# Patient Record
Sex: FEMALE | Race: WHITE
[De-identification: names, ages, dates, MRNs, and addresses within clinical notes are randomized per-mention and may not be internally consistent; named-entity substitution may affect disease eponyms.]

---

## 2017-04-22 ENCOUNTER — HOSPITAL ENCOUNTER (INPATIENT)
Dept: HOSPITAL 74 - JER | Age: 82
LOS: 3 days | Discharge: HOME HEALTH | DRG: 558 | End: 2017-04-25
Attending: FAMILY MEDICINE | Admitting: FAMILY MEDICINE
Payer: COMMERCIAL

## 2017-04-22 VITALS — BODY MASS INDEX: 21.3 KG/M2

## 2017-04-22 DIAGNOSIS — M16.0: ICD-10-CM

## 2017-04-22 DIAGNOSIS — E78.5: ICD-10-CM

## 2017-04-22 DIAGNOSIS — Z98.890: ICD-10-CM

## 2017-04-22 DIAGNOSIS — K21.9: ICD-10-CM

## 2017-04-22 DIAGNOSIS — M25.452: ICD-10-CM

## 2017-04-22 DIAGNOSIS — K76.89: ICD-10-CM

## 2017-04-22 DIAGNOSIS — E03.9: ICD-10-CM

## 2017-04-22 DIAGNOSIS — M48.50XS: ICD-10-CM

## 2017-04-22 DIAGNOSIS — M65.852: Primary | ICD-10-CM

## 2017-04-22 DIAGNOSIS — N28.1: ICD-10-CM

## 2017-04-22 DIAGNOSIS — M47.9: ICD-10-CM

## 2017-04-22 DIAGNOSIS — M54.5: ICD-10-CM

## 2017-04-22 DIAGNOSIS — I10: ICD-10-CM

## 2017-04-22 LAB
ALBUMIN SERPL-MCNC: 3.2 G/DL (ref 3.4–5)
ALP SERPL-CCNC: 90 U/L (ref 45–117)
ALT SERPL-CCNC: 19 U/L (ref 12–78)
ANION GAP SERPL CALC-SCNC: 6 MMOL/L (ref 8–16)
AST SERPL-CCNC: 27 U/L (ref 15–37)
BASOPHILS # BLD: 1 % (ref 0–2)
BILIRUB SERPL-MCNC: 0.5 MG/DL (ref 0.2–1)
CALCIUM SERPL-MCNC: 8.4 MG/DL (ref 8.5–10.1)
CO2 SERPL-SCNC: 27 MMOL/L (ref 21–32)
CREAT SERPL-MCNC: 0.5 MG/DL (ref 0.55–1.02)
DEPRECATED RDW RBC AUTO: 13.4 % (ref 11.6–15.6)
EOSINOPHIL # BLD: 1.6 % (ref 0–4.5)
GLUCOSE SERPL-MCNC: 88 MG/DL (ref 74–106)
MCH RBC QN AUTO: 30.4 PG (ref 25.7–33.7)
MCHC RBC AUTO-ENTMCNC: 34.3 G/DL (ref 32–36)
MCV RBC: 88.6 FL (ref 80–96)
NEUTROPHILS # BLD: 62.9 % (ref 42.8–82.8)
PLATELET # BLD AUTO: 324 K/MM3 (ref 134–434)
PMV BLD: 7.1 FL (ref 7.5–11.1)
PROT SERPL-MCNC: 6.7 G/DL (ref 6.4–8.2)
WBC # BLD AUTO: 7.4 K/MM3 (ref 4–10)

## 2017-04-22 NOTE — PDOC
History of Present Illness





- General


Chief Complaint: Pain


Stated Complaint: BACK PAIN


Time Seen by Provider: 04/22/17 15:18


History Source: Patient


Exam Limitations: No Limitations





- History of Present Illness


Initial Comments: 


04/22/17 17:20


88 yo F with significant PMHx of spine injury presents with three week history 

of groin pain. She describe intermittent 10/10 shock like groin pain that 

originates in her back and shoots down her inner thigh and down to her knee. 

She states pain is aggravated by lifting her leg and certain positions. 

Alleviating by lying in fetal position and Vicodin that her daughter gave her.  

Denies neurological sensory deficit. She was hospitalized in 2015 for spinal 

fractures x 2 .  Denies fever,chills, CP, HA, abd. pain, n/v.














Occurred: reports: other (3 weeks)


Severity: reports: moderate


Pain Location: reports: lower extremity


Method of Injury: Yes: other


Modifying Factors: improves with: rest


Loss of Consciousness: no loss of consciousness


Associated Symptoms (Fall): denies symptoms





Past History





- Travel


Traveled outside of the country in the last 30 days: No


Close contact w/someone who was outside of country & ill: No





- Past Medical History


Allergies/Adverse Reactions: 


 Allergies











Allergy/AdvReac Type Severity Reaction Status Date / Time


 


Sulfa (Sulfonamide Allergy   Verified 04/09/15 14:30





Antibiotics)     


 


acetaminophen [From Tylenol] AdvReac   Verified 04/09/15 14:31











Home Medications: 


Ambulatory Orders





Aspirin [ASA -] 81 mg PO DAILY 12/14/14 


Polyethylene Glycol 3350 [Miralax 119 gm Btl -] 17 gm PO DAILY PRN 30 Days 01/23

/15 


Calcium 500Mg/Vit-D 200 Units [Os-Vito 500+D -] 1 combo PO DAILY #0 tablet 04/14/

15 


Zolpidem Tartrate [Ambien] 10 mg PO HS PRN #0 tablet 04/14/15 


Gabapentin 0 mg PO BID 04/22/17 


Propranolol HCl [Inderal -] 20 mg PO TID 04/22/17 


Lidocaine 5% Patch [Lidoderm -] 1 patch TP DAILY  patch 04/25/17 


Oxycodone HCl [Roxicodone -] 5 mg PO Q4H PRN #0 tablet MDD 6 04/25/17 








Anemia: No


Asthma: No


Cancer: No


Cardiac Disorders: Yes (PALPITATIONS)


CVA: No


COPD: No


CHF: No


Dementia: No


Diabetes: No


 Disorders: No


HTN: No


Hypercholesterolemia: Yes


Liver Disease: No


Seizures: No


Thyroid Disease: Yes





- Surgical History


Abdominal Surgery: Yes


Appendectomy: Yes


Cardiac Surgery: No


Lung Surgery: No


Neurologic Surgery: Yes (laminectomy)


Orthopedic Surgery: No





- Psycho/Social/Smoking Cessation Hx


Anxiety: No


Suicidal Ideation: No


Smoking Status: No


Smoking History: Never smoked


Have you smoked in the past 12 months: No


Number of Cigarettes Smoked Daily: 0


Information on smoking cessation initiated: No


Hx Alcohol Use: No


Drug/Substance Use Hx: No


Substance Use Type: None


Hx Substance Use Treatment: No





Trauma Specific PMHX





- Complaint Specific PMHX


Back Injury: Yes





**Review of Systems





- Review of Systems


Able to Perform ROS?: Yes


Is the patient limited English proficient: No


Neurological: Yes: Paresthesia, Other (sharp electrical pain shooting down her 

thigh. )


All Other Systems: Reviewed and Negative





*Physical Exam





- Vital Signs


 Last Vital Signs











Temp Pulse Resp BP Pulse Ox


 


 98.3 F   83   20   144/100   96 


 


 04/22/17 14:41  04/22/17 14:41  04/22/17 14:41  04/22/17 14:41  04/22/17 14:41














- Physical Exam


General Appearance: Yes: Mild Distress


HEENT: positive: EOMI, QUOC


Neck: positive: Supple


Respiratory/Chest: positive: Lungs Clear, Normal Breath Sounds.  negative: 

Respiratory Distress, Accessory Muscle Use


Cardiovascular: positive: Regular Rhythm, Regular Rate, S1, S2.  negative: Edema

, JVD, Murmur


Vascular Pulses: Dorsalis-Pedis (R): 2+, Doralis-Pedis (L): 2+


Gastrointestinal/Abdominal: positive: Normal Bowel Sounds, Flat, Soft.  negative

: Organomegaly


Musculoskeletal: positive: Normal Inspection, Decreased Range of Motion, 

Vertebral Tenderness, Other (marked tenderness at level of L4 on left side. ).  

negative: CVA Tenderness


Extremity: positive: Normal Inspection, Other (limited range of motion 

secondary to reproduction of pain )


Neurologic: positive: Fully Oriented, Alert, Normal Mood/Affect.  negative: 

Numbness, Sensory Deficit





ED Treatment Course





- LABORATORY


CBC & Chemistry Diagram: 


 04/25/17 06:15





 04/25/17 06:15





- RADIOLOGY


Radiology Studies Ordered: 














 Category Date Time Status


 


 SPINE-LUMBAR SACRAL [RAD] Stat Radiology  04/22/17 15:57 Taken














Medical Decision Making





- Medical Decision Making


04/22/17 17:26


A:88 yo F with significant PMHx of spine injury presents with three week 

history of groin pain. 


P:


* Will get CBC, CMP , and UA to r/o any infectious process and assess kidney 

and liver function to help guide analgesic choice. 


* Lumbar and Sacral XRAY as well as hip and pelvic xray to r/o acute fracture. 


* Pain meds as soon as kidney and liver funct. results available. 














*DC/Admit/Observation/Transfer


Diagnosis at time of Disposition: 


 Synovitis of hip





- Discharge Dispostion


Condition at time of disposition: Stable

## 2017-04-22 NOTE — PDOC
Attending Attestation





- Resident


Resident Name: Scott Galindo





- ED Attending Attestation


I have performed the following: I have examined & evaluated the patient, The 

case was reviewed & discussed with the resident, I agree w/resident's findings 

& plan





- HPI


HPI: 





04/22/17 17:25


87y F hx of thyroid disease, hl, palpitations, multiple back fx s/p surgery 

presents with L groin pain x 3 weeks - pt sates the pain started 3 weeks ago 

when she got out of bed and put her leg down, it was a sharp pain that started 

in her left jose and radiated to her inner thigh and down the knee. The pt 

denies fever/chills, urinary/bowel incontinence, back pain, weakness, the pain 

is worse with certain positions. Thee has been no recent trauma/injuries. On 

exam the pt has no focal back pain/tenderness, she has no focal tenderness to 

her abdomen, hips/pelvis, no discomfort on passive ROM of her L hip, but she 

seemd to have increased pain with external rotation/abduction of her L hip.





will obtain hip/plevis


will ck basic labs as pt states she has liver and renal cysts and was taken off 

of tylenol


will give pt a percocet (1 dose of tylenol should not affect her even if her 

LFTs are elvated)





will sign the ptaient out to dr. Avila to fu with the results and reassess the 

patient


may need to admit for pain mangement if cannot adaquately control her pain. 











- Physicial Exam


PE: 





04/23/17 17:59


see above





- Medical Decision Making





04/23/17 17:59


see above

## 2017-04-22 NOTE — PDOC
*Physical Exam





- Vital Signs


 Last Vital Signs











Temp Pulse Resp BP Pulse Ox


 


 98.3 F   83   20   144/100   96 


 


 04/22/17 14:41  04/22/17 14:41  04/22/17 14:41  04/22/17 14:41  04/22/17 14:41














**Heart Score/ECG Review


  ** #1


ECG reviewed & interpreted by me at: 00:51





04/23/17 00:51


Sinus rhythm, rate of 63 bpm


Axis nml


intervals nml


No st elevations or depressions


Low voltage








ED Treatment Course





- LABORATORY


CBC & Chemistry Diagram: 


 04/22/17 17:34





 04/22/17 17:34





- ADDITIONAL ORDERS


Additional order review: 


 Laboratory  Results











  04/22/17





  17:34


 


Sodium  133 L


 


Potassium  3.9


 


Chloride  100


 


Carbon Dioxide  27


 


Anion Gap  6 L


 


BUN  13


 


Creatinine  0.5 L


 


Creat Clearance w eGFR  > 60


 


Random Glucose  88


 


Calcium  8.4 L


 


Total Bilirubin  0.5  D


 


AST  27  D


 


ALT  19  D


 


Alkaline Phosphatase  90


 


Total Protein  6.7


 


Albumin  3.2 L








 











  04/22/17





  17:34


 


RBC  4.23


 


MCV  88.6


 


MCHC  34.3


 


RDW  13.4


 


MPV  7.1 L


 


Neutrophils %  62.9


 


Lymphocytes %  25.9


 


Monocytes %  8.6


 


Eosinophils %  1.6


 


Basophils %  1.0














- RADIOLOGY


Radiology Studies Ordered: 














 Category Date Time Status


 


 PELVIS CT WITHOUT CONTRAST [CT] Stat CT Scan  04/22/17 18:33 Ordered














- Medications


Given in the ED: 


ED Medications














Discontinued Medications














Generic Name Dose Route Start Last Admin





  Trade Name Freq  PRN Reason Stop Dose Admin


 


Oxycodone/Acetaminophen  1 combo 04/22/17 17:24 04/22/17 17:35





  Percocet 5/325 -  PO 04/22/17 17:25  1 combo





  ONCE ONE   Administration














Medical Decision Making





- Medical Decision Making


04/22/17 18:48


I received this patient on sign out


She has ha 3 weeks of left groin pain


No trauma


No fevers


Pain is worse with flexion and external rotation of the left hip





X ray reviewed


pt osteopenic


Unclear if there is an occult fracture


will do Ct


pt continues to have pain


Will re assess for need for observation





04/22/17 20:30


CT demonstrates:


Small left joint effusion with thickening of the synovium with subtle stranding 

suggesting possible synovitis due either to rheumatoid or inflammatory 

arthritic process vs. septic joint





pt continuies to have pain with either extending her left leg, or moving her 

left leg





will admit 





04/22/17 20:33


case reviewed with Dr Martínez


will hold on abx


will consult ortho/id





clinical impression: synovitis





*DC/Admit/Observation/Transfer


Diagnosis at time of Disposition: 


 Synovitis of hip





- Discharge Dispostion


Condition at time of disposition: Stable


Admit: Yes

## 2017-04-23 LAB
ALBUMIN SERPL-MCNC: 3.3 G/DL (ref 3.4–5)
ALP SERPL-CCNC: 93 U/L (ref 45–117)
ALT SERPL-CCNC: 17 U/L (ref 12–78)
ANION GAP SERPL CALC-SCNC: 9 MMOL/L (ref 8–16)
AST SERPL-CCNC: 29 U/L (ref 15–37)
BASOPHILS # BLD: 0.7 % (ref 0–2)
BILIRUB SERPL-MCNC: 0.6 MG/DL (ref 0.2–1)
CALCIUM SERPL-MCNC: 8.7 MG/DL (ref 8.5–10.1)
CO2 SERPL-SCNC: 28 MMOL/L (ref 21–32)
COCKROFT - GAULT: 58.83
CREAT SERPL-MCNC: 0.6 MG/DL (ref 0.55–1.02)
DEPRECATED RDW RBC AUTO: 13.2 % (ref 11.6–15.6)
EOSINOPHIL # BLD: 1.5 % (ref 0–4.5)
GLUCOSE SERPL-MCNC: 83 MG/DL (ref 74–106)
MCH RBC QN AUTO: 30.5 PG (ref 25.7–33.7)
MCHC RBC AUTO-ENTMCNC: 34.3 G/DL (ref 32–36)
MCV RBC: 88.9 FL (ref 80–96)
NEUTROPHILS # BLD: 65.5 % (ref 42.8–82.8)
PLATELET # BLD AUTO: 329 K/MM3 (ref 134–434)
PMV BLD: 6.7 FL (ref 7.5–11.1)
PROT SERPL-MCNC: 6.7 G/DL (ref 6.4–8.2)
WBC # BLD AUTO: 5.6 K/MM3 (ref 4–10)

## 2017-04-23 RX ADMIN — HEPARIN SODIUM SCH: 5000 INJECTION, SOLUTION INTRAVENOUS; SUBCUTANEOUS at 00:07

## 2017-04-23 RX ADMIN — PROPRANOLOL HYDROCHLORIDE SCH MG: 20 TABLET ORAL at 13:52

## 2017-04-23 RX ADMIN — GABAPENTIN SCH MG: 100 CAPSULE ORAL at 22:08

## 2017-04-23 RX ADMIN — PROPRANOLOL HYDROCHLORIDE SCH MG: 20 TABLET ORAL at 22:08

## 2017-04-23 RX ADMIN — PANTOPRAZOLE SODIUM SCH MG: 40 TABLET, DELAYED RELEASE ORAL at 18:36

## 2017-04-23 RX ADMIN — ASPIRIN 81 MG SCH MG: 81 TABLET ORAL at 09:24

## 2017-04-23 RX ADMIN — GABAPENTIN SCH MG: 100 CAPSULE ORAL at 09:24

## 2017-04-23 RX ADMIN — PROPRANOLOL HYDROCHLORIDE SCH MG: 20 TABLET ORAL at 06:37

## 2017-04-23 RX ADMIN — HEPARIN SODIUM SCH UNIT: 5000 INJECTION, SOLUTION INTRAVENOUS; SUBCUTANEOUS at 22:05

## 2017-04-23 RX ADMIN — GABAPENTIN SCH: 100 CAPSULE ORAL at 00:08

## 2017-04-23 RX ADMIN — LIDOCAINE SCH PATCH: 50 PATCH TOPICAL at 18:36

## 2017-04-23 RX ADMIN — HEPARIN SODIUM SCH UNIT: 5000 INJECTION, SOLUTION INTRAVENOUS; SUBCUTANEOUS at 09:26

## 2017-04-23 RX ADMIN — ZOLPIDEM TARTRATE PRN MG: 5 TABLET, COATED ORAL at 23:35

## 2017-04-23 RX ADMIN — Medication SCH TAB: at 09:24

## 2017-04-23 NOTE — EKG
Test Reason : 

Blood Pressure : ***/*** mmHG

Vent. Rate : 063 BPM     Atrial Rate : 063 BPM

   P-R Int : 170 ms          QRS Dur : 066 ms

    QT Int : 412 ms       P-R-T Axes : 049 005 051 degrees

   QTc Int : 421 ms

 

NORMAL SINUS RHYTHM

LOW VOLTAGE QRS

ABNORMAL ECG

WHEN COMPARED WITH ECG OF 09-APR-2015 16:15,

NO SIGNIFICANT CHANGE WAS FOUND

Confirmed by ABDULLAHI QUEVEDO MD ([id number]) on 4/23/2017 12:25:32 PM

 

Referred By:             Confirmed By:ABDULLAHI QUEVEDO MD

## 2017-04-23 NOTE — HP
Admitting History and Physical





- Primary Care Physician


PCP: Catrachita Pineda





- Admission


Chief Complaint: L GROIN PAIN


History Source: Medical Record





- Past Medical History


Cardiovascular: Yes: HTN


Gastrointestinal: Yes: GERD


Musculoskeletal: Yes: Chronic low back pain





- Smoking History


Smoking history: Never smoked


Have you smoked in the past 12 months: No


Aproximately how many cigarettes per day: 0





- Alcohol/Substance Use


Hx Alcohol Use: No





Home Medications





- Allergies


Allergies/Adverse Reactions: 


 Allergies











Allergy/AdvReac Type Severity Reaction Status Date / Time


 


Sulfa (Sulfonamide Allergy   Verified 04/09/15 14:30





Antibiotics)     


 


acetaminophen [From Tylenol] AdvReac   Verified 04/09/15 14:31














- Home Medications


Home Medications: 


Ambulatory Orders





Aspirin [ASA -] 81 mg PO DAILY 12/14/14 


Polyethylene Glycol 3350 [Miralax 119 gm Btl -] 17 gm PO DAILY PRN 30 Days 01/23

/15 


Calcium 500Mg/Vit-D 200 Units [Os-Vito 500+D -] 1 combo PO DAILY #0 tablet 04/14/

15 


Zolpidem Tartrate [Ambien] 10 mg PO HS PRN #0 tablet 04/14/15 


Gabapentin 0 mg PO BID 04/22/17 


Propranolol HCl [Inderal -] 20 mg PO TID 04/22/17 











Review of Systems





- Review of Systems


Constitutional: denies: Chills, Fever


Neck: reports: No Symptoms


Cardiovascular: reports: No Symptoms


Respiratory: reports: No Symptoms


Gastrointestinal: reports: No Symptoms


Musculoskeletal: reports: Joint Pain (L HIP WITH FLEX)





Physical Examination


Vital Signs: 


 Vital Signs











Temperature  98.6 F   04/23/17 15:04


 


Pulse Rate  88   04/23/17 11:33


 


Respiratory Rate  18   04/23/17 15:04


 


Blood Pressure  139/78   04/23/17 15:04


 


O2 Sat by Pulse Oximetry (%)  98   04/23/17 09:00











Constitutional: Yes: Calm


HENT: Yes: WNL


Neck: Yes: WNL


Cardiovascular: Yes: WNL


Respiratory: Yes: WNL


Gastrointestinal: Yes: WNL


Musculoskeletal: Yes: Joint Stiffness, Other (L HIP PAIN WITH FLEX)


Edema: No


Labs: 


 CBC, BMP





 04/23/17 12:35 





 04/23/17 12:35 











Imaging





- Results


Chest X-ray: Report Reviewed


X-ray: Report Reviewed


Cat Scan: Report Reviewed





Problem List





- Problems


(1) Synovitis of hip


Code(s): M65.9 - SYNOVITIS AND TENOSYNOVITIS, UNSPECIFIED





(2) Back injury


Code(s): S39.92XA - UNSPECIFIED INJURY OF LOWER BACK, INITIAL ENCOUNTER   





(3) GERD (gastroesophageal reflux disease)


Code(s): K21.9 - GASTRO-ESOPHAGEAL REFLUX DISEASE WITHOUT ESOPHAGITIS   

Qualifiers: 


     Esophagitis presence: without esophagitis     Qualified Code(s): K21.9 - 

Gastro-esophageal reflux disease without esophagitis  





(4) HTN (hypertension)


Code(s): I10 - ESSENTIAL (PRIMARY) HYPERTENSION   Qualifiers: 


     Hypertension type: essential hypertension     Qualified Code(s): I10 - 

Essential (primary) hypertension  








Assessment/Plan


88 yo F with significant PMHx of spine injury presents with three week history 

of groin pain. She describe intermittent 10/10 shock like groin pain that 

originates in her back and shoots down her inner thigh and down to her knee. 

She states pain is aggravated by lifting her leg and certain positions. 

Alleviating by lying in fetal position and Vicodin that her daughter gave her.  

Denies neurological sensory deficit. She was hospitalized in 2015 for spinal 

fractures x 2 .  Denies fever,chills, CP, HA, abd. pain, n/v.





(1) Synovitis of hip


Code(s): M65.9 - SYNOVITIS AND TENOSYNOVITIS, UNSPECIFIED


XRay & CT SHOW NO Fx. HAS EFFUSION


PT


ORTHO


OXYCODONE


ADD LIDODERM TP (APAP ALLERGY)





(2) Back injury


Code(s): S39.92XA - UNSPECIFIED INJURY OF LOWER BACK, INITIAL ENCOUNTER   


H/O MULTIPLE SPINE COMPRESSION Fx


PT





(3) GERD (gastroesophageal reflux disease)


Code(s): K21.9 - GASTRO-ESOPHAGEAL REFLUX DISEASE WITHOUT ESOPHAGITIS   

Qualifiers: 


     Esophagitis presence: without esophagitis     Qualified Code(s): K21.9 - 

Gastro-esophageal reflux disease without esophagitis  


PPI





(4) HTN (hypertension)


Code(s): I10 - ESSENTIAL (PRIMARY) HYPERTENSION   Qualifiers: 


     Hypertension type: essential hypertension     Qualified Code(s): I10 - 

Essential (primary) hypertension  


ACCEPTABLE








FM

## 2017-04-24 LAB
ALBUMIN SERPL-MCNC: 2.9 G/DL (ref 3.4–5)
ALP SERPL-CCNC: 89 U/L (ref 45–117)
ALT SERPL-CCNC: 15 U/L (ref 12–78)
ANION GAP SERPL CALC-SCNC: 13 MMOL/L (ref 8–16)
AST SERPL-CCNC: 24 U/L (ref 15–37)
BASOPHILS # BLD: 1 % (ref 0–2)
BILIRUB SERPL-MCNC: 0.5 MG/DL (ref 0.2–1)
CALCIUM SERPL-MCNC: 8.5 MG/DL (ref 8.5–10.1)
CO2 SERPL-SCNC: 25 MMOL/L (ref 21–32)
COCKROFT - GAULT: 58.83
CREAT SERPL-MCNC: 0.6 MG/DL (ref 0.55–1.02)
DEPRECATED RDW RBC AUTO: 13.1 % (ref 11.6–15.6)
EOSINOPHIL # BLD: 2.7 % (ref 0–4.5)
GLUCOSE SERPL-MCNC: 80 MG/DL (ref 74–106)
MCH RBC QN AUTO: 30.5 PG (ref 25.7–33.7)
MCHC RBC AUTO-ENTMCNC: 34.7 G/DL (ref 32–36)
MCV RBC: 87.9 FL (ref 80–96)
NEUTROPHILS # BLD: 54.5 % (ref 42.8–82.8)
PLATELET # BLD AUTO: 314 K/MM3 (ref 134–434)
PMV BLD: 7.2 FL (ref 7.5–11.1)
PROT SERPL-MCNC: 6 G/DL (ref 6.4–8.2)
WBC # BLD AUTO: 5.3 K/MM3 (ref 4–10)

## 2017-04-24 RX ADMIN — PROPRANOLOL HYDROCHLORIDE SCH MG: 20 TABLET ORAL at 21:07

## 2017-04-24 RX ADMIN — Medication SCH TAB: at 09:40

## 2017-04-24 RX ADMIN — PANTOPRAZOLE SODIUM SCH MG: 40 TABLET, DELAYED RELEASE ORAL at 09:40

## 2017-04-24 RX ADMIN — HEPARIN SODIUM SCH UNIT: 5000 INJECTION, SOLUTION INTRAVENOUS; SUBCUTANEOUS at 21:05

## 2017-04-24 RX ADMIN — HEPARIN SODIUM SCH UNIT: 5000 INJECTION, SOLUTION INTRAVENOUS; SUBCUTANEOUS at 09:40

## 2017-04-24 RX ADMIN — GABAPENTIN SCH MG: 100 CAPSULE ORAL at 21:07

## 2017-04-24 RX ADMIN — LIDOCAINE SCH PATCH: 50 PATCH TOPICAL at 09:40

## 2017-04-24 RX ADMIN — GABAPENTIN SCH MG: 100 CAPSULE ORAL at 09:40

## 2017-04-24 RX ADMIN — ASPIRIN 81 MG SCH MG: 81 TABLET ORAL at 09:40

## 2017-04-24 RX ADMIN — PROPRANOLOL HYDROCHLORIDE SCH MG: 20 TABLET ORAL at 15:12

## 2017-04-24 RX ADMIN — PROPRANOLOL HYDROCHLORIDE SCH MG: 20 TABLET ORAL at 06:03

## 2017-04-24 NOTE — PN
Progress Note, Physician





- Current Medication List


Current Medications: 


Active Medications





Aspirin (Asa -)  81 mg PO DAILY [facility]


   Last Admin: 04/24/17 09:40 Dose:  81 mg


Calcium Carbonate/Cholecalciferol (Os-Vito 500+D -)  1 tab PO DAILY [facility]


   Last Admin: 04/24/17 09:40 Dose:  1 tab


Docusate Sodium (Colace -)  100 mg PO BID PRN


   PRN Reason: CONSTIPATION


Gabapentin (Neurontin -)  100 mg PO BID [facility]


   Last Admin: 04/24/17 09:40 Dose:  100 mg


Heparin Sodium (Porcine) (Heparin -)  5,000 unit SQ BID [facility]


   Last Admin: 04/24/17 09:40 Dose:  5,000 unit


Lidocaine (Lidoderm Patch -)  1 patch TP DAILY [facility]


   Last Admin: 04/24/17 09:40 Dose:  1 patch


Oxycodone HCl (Roxicodone -)  5 mg PO Q4H PRN


   PRN Reason: MODERATE PAIN


   Last Admin: 04/23/17 20:18 Dose:  5 mg


Pantoprazole Sodium (Protonix -)  40 mg PO DAILY [facility]


   Last Admin: 04/24/17 09:40 Dose:  40 mg


Polyethylene Glycol (Miralax (For Daily Use) -)  17 gm PO DAILY PRN


   PRN Reason: CONSTIPATION


   Last Admin: 04/24/17 09:43 Dose:  17 gm


Propranolol HCl (Inderal -)  20 mg PO TID [facility]


   Last Admin: 04/24/17 15:12 Dose:  20 mg


Zolpidem Tartrate (Ambien -)  5 mg PO HS PRN


   PRN Reason: INSOMNIA


   Last Admin: 04/23/17 23:35 Dose:  5 mg











- Objective


Vital Signs: 


 Vital Signs











Temperature  97.9 F   04/24/17 15:19


 


Pulse Rate  74   04/24/17 15:19


 


Respiratory Rate  16   04/24/17 15:19


 


Blood Pressure  104/74   04/24/17 15:19


 


O2 Sat by Pulse Oximetry (%)  98   04/24/17 09:00











Constitutional: Yes: Calm


Neck: Yes: WNL


Cardiovascular: Yes: WNL


Respiratory: Yes: WNL


Gastrointestinal: Yes: WNL


Edema: No


Labs: 


 CBC, BMP





 04/24/17 06:15 





 04/24/17 06:15 











Problem List





- Problems


(1) Synovitis of hip


Code(s): M65.9 - SYNOVITIS AND TENOSYNOVITIS, UNSPECIFIED





(2) Back injury


Code(s): S39.92XA - UNSPECIFIED INJURY OF LOWER BACK, INITIAL ENCOUNTER   





(3) GERD (gastroesophageal reflux disease)


Code(s): K21.9 - GASTRO-ESOPHAGEAL REFLUX DISEASE WITHOUT ESOPHAGITIS   

Qualifiers: 


     Esophagitis presence: without esophagitis     Qualified Code(s): K21.9 - 

Gastro-esophageal reflux disease without esophagitis  





(4) HTN (hypertension)


Code(s): I10 - ESSENTIAL (PRIMARY) HYPERTENSION   Qualifiers: 


     Hypertension type: essential hypertension     Qualified Code(s): I10 - 

Essential (primary) hypertension  








Assessment/Plan


86 yo F with significant PMHx of spine injury presents with three week history 

of groin pain. She describe intermittent 10/10 shock like groin pain that 

originates in her back and shoots down her inner thigh and down to her knee. 

She states pain is aggravated by lifting her leg and certain positions. 

Alleviating by lying in fetal position and Vicodin that her daughter gave her.  

Denies neurological sensory deficit. She was hospitalized in 2015 for spinal 

fractures x 2 .  Denies fever,chills, CP, HA, abd. pain, n/v.





(1) Synovitis of hip


Code(s): M65.9 - SYNOVITIS AND TENOSYNOVITIS, UNSPECIFIED


XRay & CT SHOW NO Fx. HAS EFFUSION


PT


ORTHO ON CASE


OXYCODONE


ADD LIDODERM TP (APAP ALLERGY)





(2) Back injury


Code(s): S39.92XA - UNSPECIFIED INJURY OF LOWER BACK, INITIAL ENCOUNTER   


H/O MULTIPLE SPINE COMPRESSION Fx


PT





(3) GERD (gastroesophageal reflux disease)


Code(s): K21.9 - GASTRO-ESOPHAGEAL REFLUX DISEASE WITHOUT ESOPHAGITIS   

Qualifiers: 


     Esophagitis presence: without esophagitis     Qualified Code(s): K21.9 - 

Gastro-esophageal reflux disease without esophagitis  


PPI





(4) HTN (hypertension)


Code(s): I10 - ESSENTIAL (PRIMARY) HYPERTENSION   Qualifiers: 


     Hypertension type: essential hypertension     Qualified Code(s): I10 - 

Essential (primary) hypertension  


ACCEPTABLE








FM

## 2017-04-24 NOTE — CON.ORTH
Consult


Reason for Consultation:: LBP, hip pain





- Past Medical History


Cardio/Vascular: Yes: HTN


Gastrointestinal: Yes: GERD


Musculoskeletal: Yes: Chronic low back pain





- Alcohol/Substance Use


Hx Alcohol Use: No





- Smoking History


Smoking history: Never smoked


Have you smoked in the past 12 months: No


Aproximately how many cigarettes per day: 0





Home Medications





- Allergies


Allergies/Adverse Reactions: 


 Allergies











Allergy/AdvReac Type Severity Reaction Status Date / Time


 


Sulfa (Sulfonamide Allergy   Verified 04/09/15 14:30





Antibiotics)     


 


acetaminophen [From Tylenol] AdvReac   Verified 04/09/15 14:31














- Home Medications


Home Medications: 


Ambulatory Orders





Aspirin [ASA -] 81 mg PO DAILY 12/14/14 


Polyethylene Glycol 3350 [Miralax 119 gm Btl -] 17 gm PO DAILY PRN 30 Days 01/23

/15 


Calcium 500Mg/Vit-D 200 Units [Os-Vito 500+D -] 1 combo PO DAILY #0 tablet 04/14/

15 


Zolpidem Tartrate [Ambien] 10 mg PO HS PRN #0 tablet 04/14/15 


Gabapentin 0 mg PO BID 04/22/17 


Propranolol HCl [Inderal -] 20 mg PO TID 04/22/17 











Physical Exam for Ortho


Vital Signs: 


 Vital Signs











Temperature  97.6 F   04/24/17 07:44


 


Pulse Rate  68   04/24/17 07:44


 


Respiratory Rate  20   04/24/17 07:44


 


Blood Pressure  119/66   04/24/17 07:44


 


O2 Sat by Pulse Oximetry (%)  98   04/23/17 21:00











Labs: 


 CBC, BMP





 04/24/17 06:15 





 04/24/17 06:15 











- Lower Extremity


Pelvis: Yes: Left, Right, Pain, Tenderness, Other (equal leg lengths, decr ROM 

with IR and ER, -SLR, nvi)





Imaging





- Results


X-ray: Report Reviewed, Image Reviewed


Cat Scan: Report Reviewed, Image Reviewed





Assessment/Plan


88 yo F with significant PMHx of spine injury presents with three week history 

of groin pain. She describe intermittent 10/10 shock like groin pain that 

originates in her back and shoots down her inner thigh and down to her knee. 

She states pain is aggravated by lifting her leg and certain positions. 

Alleviating by lying in fetal position and Vicodin that her daughter gave her.  

Denies neurological sensory deficit. She was hospitalized in 2015 for spinal 

fractures x 2 .  Denies fever,chills, CP, HA, abd. pain, n/v. no bowel/bladder 

dysfunction. Feeling better today





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


Risks and benefits were d/w pt in detail- most of her symptoms due to LS spine


PT eval


If pain does not improve, consider pain management for injections


pain control


dvt ppx


d/w Dr. Vee

## 2017-04-25 VITALS — SYSTOLIC BLOOD PRESSURE: 121 MMHG | HEART RATE: 84 BPM | DIASTOLIC BLOOD PRESSURE: 70 MMHG | TEMPERATURE: 96.1 F

## 2017-04-25 LAB
ALBUMIN SERPL-MCNC: 2.9 G/DL (ref 3.4–5)
ALP SERPL-CCNC: 86 U/L (ref 45–117)
ALT SERPL-CCNC: 17 U/L (ref 12–78)
ANION GAP SERPL CALC-SCNC: 12 MMOL/L (ref 8–16)
AST SERPL-CCNC: 26 U/L (ref 15–37)
BASOPHILS # BLD: 0.6 % (ref 0–2)
BILIRUB SERPL-MCNC: 0.6 MG/DL (ref 0.2–1)
CALCIUM SERPL-MCNC: 8.3 MG/DL (ref 8.5–10.1)
CO2 SERPL-SCNC: 25 MMOL/L (ref 21–32)
COCKROFT - GAULT: 58.83
CREAT SERPL-MCNC: 0.6 MG/DL (ref 0.55–1.02)
DEPRECATED RDW RBC AUTO: 13.2 % (ref 11.6–15.6)
EOSINOPHIL # BLD: 2.3 % (ref 0–4.5)
GLUCOSE SERPL-MCNC: 81 MG/DL (ref 74–106)
MCH RBC QN AUTO: 30.2 PG (ref 25.7–33.7)
MCHC RBC AUTO-ENTMCNC: 34.3 G/DL (ref 32–36)
MCV RBC: 87.9 FL (ref 80–96)
NEUTROPHILS # BLD: 62.4 % (ref 42.8–82.8)
PLATELET # BLD AUTO: 322 K/MM3 (ref 134–434)
PMV BLD: 7.3 FL (ref 7.5–11.1)
PROT SERPL-MCNC: 6.1 G/DL (ref 6.4–8.2)
WBC # BLD AUTO: 6.4 K/MM3 (ref 4–10)

## 2017-04-25 RX ADMIN — ASPIRIN 81 MG SCH MG: 81 TABLET ORAL at 09:28

## 2017-04-25 RX ADMIN — PROPRANOLOL HYDROCHLORIDE SCH MG: 20 TABLET ORAL at 06:41

## 2017-04-25 RX ADMIN — PANTOPRAZOLE SODIUM SCH MG: 40 TABLET, DELAYED RELEASE ORAL at 09:28

## 2017-04-25 RX ADMIN — HEPARIN SODIUM SCH UNIT: 5000 INJECTION, SOLUTION INTRAVENOUS; SUBCUTANEOUS at 09:28

## 2017-04-25 RX ADMIN — LIDOCAINE SCH PATCH: 50 PATCH TOPICAL at 09:28

## 2017-04-25 RX ADMIN — Medication SCH TAB: at 09:28

## 2017-04-25 RX ADMIN — GABAPENTIN SCH MG: 100 CAPSULE ORAL at 09:28

## 2017-04-25 RX ADMIN — ZOLPIDEM TARTRATE PRN MG: 5 TABLET, COATED ORAL at 00:18

## 2017-04-25 NOTE — DS
Physical Examination


Vital Signs: 


 Vital Signs











Temperature  97.8 F   04/25/17 06:16


 


Pulse Rate  75   04/25/17 06:16


 


Respiratory Rate  20   04/25/17 06:16


 


Blood Pressure  139/83   04/25/17 06:16


 


O2 Sat by Pulse Oximetry (%)  98   04/24/17 21:00











Constitutional: Yes: Mild Distress


Eyes: Yes: WNL


HENT: Yes: WNL


Neck: Yes: WNL


Cardiovascular: Yes: WNL


Respiratory: Yes: WNL


Gastrointestinal: Yes: WNL


Renal/: Yes: WNL


Musculoskeletal: Yes: Back Pain, Muscle Weakness


Extremities: Yes: WNL


Edema: Yes


Edema: LLE: Trace, RLE: Trace


Peripheral Pulses WNL: Yes


Integumentary: Yes: WNL


Wound/Incision: Yes: Clean/Dry


Neurological: Yes: Pre-Existing Deficit


...Motor Strength: LLE, RLE


Psychiatric: Yes: WNL





Discharge Summary


Reason For Visit: SYNOVITIS OF HIP


Current Active Problems





Synovitis of hip (Acute) 








Procedures: Principal: ct scan, xrays


Other Procedures: labs


Hospital Course: 


admitted for fall/acute pain worked up seen by orthopedics and pain control , 

sent home with [facility]


Condition: Stable





- Instructions


Diet, Activity, Other Instructions: 


low salt


Referrals: 


Catrachita Pineda MD [Primary Care Provider] - 


Disposition: [facility]/HOME HEALTH CARE





- Home Medications


Comprehensive Discharge Medication List: 


Ambulatory Orders





Aspirin [ASA -] 81 mg PO DAILY 12/14/14 


Polyethylene Glycol 3350 [Miralax 119 gm Btl -] 17 gm PO DAILY PRN 30 Days 01/23

/15 


Calcium 500Mg/Vit-D 200 Units [Os-Vito 500+D -] 1 combo PO DAILY #0 tablet 04/14/

15 


Zolpidem Tartrate [Ambien] 10 mg PO HS PRN #0 tablet 04/14/15 


Gabapentin 0 mg PO BID 04/22/17 


Propranolol HCl [Inderal -] 20 mg PO TID 04/22/17 


Lidocaine 5% Patch [Lidoderm -] 1 patch TP DAILY  patch 04/25/17 


Oxycodone HCl [Roxicodone -] 5 mg PO Q4H PRN #0 tablet MDD 6 04/25/17

## 2017-04-29 ENCOUNTER — HOSPITAL ENCOUNTER (EMERGENCY)
Dept: HOSPITAL 74 - JER | Age: 82
LOS: 1 days | Discharge: HOME | End: 2017-04-30
Payer: COMMERCIAL

## 2017-04-29 VITALS — BODY MASS INDEX: 21.6 KG/M2

## 2017-04-29 VITALS — TEMPERATURE: 97.9 F

## 2017-04-29 DIAGNOSIS — R00.2: ICD-10-CM

## 2017-04-29 DIAGNOSIS — E07.9: ICD-10-CM

## 2017-04-29 DIAGNOSIS — M16.12: Primary | ICD-10-CM

## 2017-04-29 DIAGNOSIS — E86.0: ICD-10-CM

## 2017-04-29 DIAGNOSIS — E78.00: ICD-10-CM

## 2017-04-29 PROCEDURE — 3E0337Z INTRODUCTION OF ELECTROLYTIC AND WATER BALANCE SUBSTANCE INTO PERIPHERAL VEIN, PERCUTANEOUS APPROACH: ICD-10-PCS | Performed by: EMERGENCY MEDICINE

## 2017-04-29 PROCEDURE — 3E0333Z INTRODUCTION OF ANTI-INFLAMMATORY INTO PERIPHERAL VEIN, PERCUTANEOUS APPROACH: ICD-10-PCS | Performed by: EMERGENCY MEDICINE

## 2017-04-29 NOTE — PDOC
History of Present Illness





- General


History Source: Patient


Exam Limitations: No Limitations





- History of Present Illness


Initial Comments: 


04/29/17 23:47


Patient is a 87 year old female with a significant past medical history of 

spine injury (s/p laminectomy in 70s) who presents to the ED with complaint of 

left hip pain. Patient was recently admitted for left groin pain and was 

discharged 4/25. Patient states that she refused to go to rehab/NH and upon 

discharge she was experiencing pain but it was not as severe as today. Patient 

states that the pain worsened yesterday and she is unable to lift her leg 

secondary to sharp shooting pain. She states that the pain radiates to her 

left buttocks and intermittently below the knee. Patient reports pain when 

spreading or moving her left lower extremity. Patient states that the pain is 

making her experience nausea and lightheadedness. 





<Rebecca Gonsalves - Last Filed: 04/29/17 23:49>





- General


History Source: Patient


Exam Limitations: No Limitations





<Aleja Avila - Last Filed: 04/30/17 02:00>





<Aleyda Bey - Last Filed: 04/30/17 22:26>





- General


Chief Complaint: Pain


Stated Complaint: LEG PAIN


Time Seen by Provider: 04/29/17 23:13





Past History





<Rebecca Gonsalves - Last Filed: 04/29/17 23:49>





- Past Medical History


Anemia: No


Asthma: No


Cancer: No


Cardiac Disorders: Yes (PALPITATIONS)


CVA: No


COPD: No


CHF: No


Dementia: No


Diabetes: No


 Disorders: No


HTN: No


Hypercholesterolemia: Yes


Liver Disease: No


Seizures: No


Thyroid Disease: Yes





- Surgical History


Abdominal Surgery: Yes


Appendectomy: Yes


Cardiac Surgery: No


Lung Surgery: No


Neurologic Surgery: Yes (laminectomy)


Orthopedic Surgery: No





- Psycho/Social/Smoking Cessation Hx


Anxiety: No


Suicidal Ideation: No


Smoking Status: No


Smoking History: Never smoked


Have you smoked in the past 12 months: No


Number of Cigarettes Smoked Daily: 0


Hx Alcohol Use: No


Drug/Substance Use Hx: No


Substance Use Type: None


Hx Substance Use Treatment: No





<Aleja Avila - Last Filed: 04/30/17 02:00>





<Aleyda Bey - Last Filed: 04/30/17 22:26>





- Past Medical History


Allergies/Adverse Reactions: 


 Allergies











Allergy/AdvReac Type Severity Reaction Status Date / Time


 


Sulfa (Sulfonamide Allergy   Verified 04/29/17 23:12





Antibiotics)     


 


acetaminophen [From Tylenol] AdvReac   Verified 04/29/17 23:12


 


oxycodone HCl AdvReac  Vomiting Verified 04/29/17 23:35





[From Roxicodone]     











Home Medications: 


Ambulatory Orders





Aspirin [ASA -] 81 mg PO DAILY 12/14/14 


Polyethylene Glycol 3350 [Miralax 119 gm Btl -] 17 gm PO DAILY PRN 30 Days 01/23

/15 


Calcium 500Mg/Vit-D 200 Units [Os-Vito 500+D -] 1 combo PO DAILY #0 tablet 04/14/

15 


Zolpidem Tartrate [Ambien] 10 mg PO HS PRN #0 tablet 04/14/15 


Gabapentin 0 mg PO BID 04/22/17 


Propranolol HCl [Inderal -] 20 mg PO TID 04/22/17 


Lidocaine 5% Patch [Lidoderm -] 1 patch TP DAILY  patch 04/25/17 











**Review of Systems





- Review of Systems


Able to Perform ROS?: Yes


Comments:: 





04/29/17 23:48


GENERAL/CONSTITUTIONAL: No: fever, chills, weakness, loss of appetite.


HEAD, EYES, EARS, NOSE AND THROAT: No: change in vision, ear pain, discharge, 

sore throat, throat swelling.


CARDIOVASCULAR: No: chest pain, lightheadedness, palpitations, syncope


RESPIRATORY: No: cough, shortness of breath, wheezing, hemoptysis, stridor.


GASTROINTESTINAL: No: nausea, vomiting, abdominal cramping, diarrhea, rectal 

bleeding, constipation. 


GENITOURINARY: No: dysuria, hematuria, frequency, urgency, flank pain.


MUSCULOSKELETAL: +left hip pain. No: back pain, neck pain, muscle swelling or 

pain


SKIN: No: lesions, pallor, rash or easy bruising.


NEUROLOGIC: No: headache, vertigo, paresthesias, weakness 


ENDOCRINE: No: unexplained weight gain or loss


HEMATOLOGIC/LYMPHATIC: No: anemia, easy bleeding, swelling nodes








<Rebecca Gonsalves - Last Filed: 04/29/17 23:49>





*Physical Exam





- Vital Signs


 Last Vital Signs











Temp Pulse Resp BP Pulse Ox


 


 97.9 F   80   22   134/59   98 


 


 04/29/17 23:22  04/29/17 23:22  04/29/17 23:22  04/29/17 23:22  04/29/17 23:22














- Physical Exam


Comments: 





04/29/17 23:49


GENERAL: The patient is in no acute distress.


HEAD: Normal with no signs of trauma.


EYES: PERRLA, EOMI, sclera anicteric, conjunctiva clear.


ENT: Ears normal, nares patent, oropharynx clear without exudates.  Moist 

mucous membranes.


NECK: Normal range of motion, supple without lymphadenopathy, JVD, or masses.


LUNGS: Breath sounds equal, clear to auscultation bilaterally.  No wheezes, and 

no crackles.


HEART:Regular rate and rhythm, normal S1 and S2 without murmur, rub or gallop.


ABDOMEN: Soft, nontender, normoactive bowel sounds.  No guarding, no rebound.


EXTREMITIES: +limited ROM of LLE secondary to pain. No edema.  No clubbing or 

cyanosis. No erythema, or tenderness.


NEUROLOGICAL: Cranial nerves II through XII grossly intact.  Normal speech.  No 

focal  neurological deficits. 


MUSCULOSKELETAL:  Back nontender to palpation, no CVA tenderness


SKIN: Warm, Dry, normal turgor, no rashes or lesions noted.








<Rebecca Gonsalves - Last Filed: 04/29/17 23:49>





- Vital Signs


 Last Vital Signs











Temp Pulse Resp BP Pulse Ox


 


 97.9 F   80   22   134/59   98 


 


 04/29/17 23:22  04/29/17 23:22  04/29/17 23:22  04/29/17 23:22  04/29/17 23:22














<Aleja Avila - Last Filed: 04/30/17 02:00>





- Vital Signs


 Last Vital Signs











Temp Pulse Resp BP Pulse Ox


 


 97.9 F   80   22   134/59   98 


 


 04/29/17 23:22  04/29/17 23:22  04/29/17 23:22  04/29/17 23:22  04/29/17 23:22














<Aleyda Bey - Last Filed: 04/30/17 22:26>





ED Treatment Course





- LABORATORY


CBC & Chemistry Diagram: 


 04/29/17 23:00





 04/29/17 23:00





<Aleja Avila - Last Filed: 04/30/17 02:00>





- LABORATORY


CBC & Chemistry Diagram: 


 04/29/17 23:00





 04/29/17 23:00





- ADDITIONAL ORDERS


Additional order review: 


 Laboratory  Results











  04/30/17 04/29/17





  02:50 23:00


 


Sodium   129 L


 


Potassium   3.9


 


Chloride   93 L


 


Carbon Dioxide   23


 


Anion Gap   13


 


BUN   10  D


 


Creatinine   0.4 L D


 


Creat Clearance w eGFR   > 60


 


Random Glucose   89


 


Calcium   8.7


 


Total Bilirubin   0.6


 


AST   23


 


ALT   18


 


Alkaline Phosphatase   102


 


Total Protein   6.5


 


Albumin   3.3 L


 


Urine Color  Straw 


 


Urine Appearance  Clear 


 


Urine pH  7.0 


 


Ur Specific Gravity  1.004 


 


Urine Protein  Negative 


 


Urine Glucose (UA)  Negative 


 


Urine Ketones  Negative 


 


Urine Blood  1+ H 


 


Urine Nitrite  Negative 


 


Urine Bilirubin  Negative 


 


Urine Urobilinogen  Negative 


 


Ur Leukocyte Esterase  Negative 


 


Urine RBC  1 


 


Urine WBC  <1 


 


Ur Epithelial Cells  Rare 








 











  04/29/17





  23:00


 


RBC  4.26


 


MCV  89.6


 


MCHC  32.7


 


RDW  13.3


 


MPV  7.0 L


 


Neutrophils %  62.8


 


Lymphocytes %  26.0


 


Monocytes %  9.0


 


Eosinophils %  1.6


 


Basophils %  0.6














- Medications


Given in the ED: 


ED Medications














Discontinued Medications














Generic Name Dose Route Start Last Admin





  Trade Name Georgeq  PRN Reason Stop Dose Admin


 


Ketorolac Tromethamine  30 mg 04/30/17 01:56 04/30/17 02:35





  Toradol Injection -  IVPUSH 04/30/17 01:57  30 mg





  ONCE ONE   Administration


 


Methocarbamol  500 mg 04/29/17 23:53 04/30/17 00:31





  Robaxin -  PO 04/29/17 23:54  500 mg





  ONCE ONE   Administration


 


Oxycodone HCl  5 mg 04/29/17 23:53 04/30/17 00:31





  Roxicodone -  PO 04/29/17 23:54  5 mg





  ONCE ONE   Administration


 


Sodium Chloride  500 ml 04/30/17 01:55 04/30/17 02:35





  Normal Saline -  IV 04/30/17 01:56  500 ml





  ONCE ONE   Administration


 


Zolpidem Tartrate  10 mg 04/30/17 01:22 04/30/17 02:34





  Ambien -  PO 04/30/17 01:23  10 mg





  ONCE ONE   Administration














<Aleyda Bey - Last Filed: 04/30/17 22:26>





Medical Decision Making





- Medical Decision Making


04/29/17 23:29


A portion of this note was documented by scribe services under my direction. I 

have reviewed the details of the note, within reason, and agree with the 

documentation with the following case summary and management plan written by me.


Nursing documentation reviewed and incorporated into medical decision making





04/30/17 00:11


This is an 86 yo F who presents to the ER with a complaint of left leg pain


She was recently admitted for the same


She was seen by orthopedics and told that her symptoms were likely related to 

Lumbar spine, with pain radiating into the left groin and upper leg.


Pt states that when she was discharged, she had pain but it was bearable


Since being home, she has been taking Gabapentin but notices that her pain is 

unbearable


She is unable to ambulate even with the assistance of a walker


she denies recurrent trauma


She states any movement of her left leg causes severe pain





Will give do basic labs


will give pain medications pt was given in the hospital 


will re assess


Pt does not want to go to subacute rehab











04/30/17 00:52


 Laboratory Tests











  04/29/17 04/29/17





  23:00 23:00


 


WBC  7.7 


 


Hgb  12.5 


 


Hct  38.2 


 


Plt Count  327 


 


Sodium   129 L


 


Potassium   3.9


 


Chloride   93 L


 


Carbon Dioxide   23


 


Anion Gap   13


 


BUN   10  D


 


Creatinine   0.4 L D


 


Random Glucose   89














04/30/17 01:40


Case reviewed with Dr Pineda


Pt can not be admitted for pain


Will give pain meds


Will have pt attempt to ambulate in the morning


Will give NS (gentle hydration) for hyponatremia


Will re assess NA


Will call for transportation home in the morning








04/30/17 02:00








<Aleja Avila - Last Filed: 04/30/17 02:00>





- Medical Decision Making


04/30/17 22:23


Pt signed out to me.  Pt is feeling better in the morning after hydration in 

the ER and gentle analgeiscs.  Pt was reassured and she feels like she can go 

home to her assisted living facility. 


She will go back with ambulance transportation





<Aleyda Bey - Last Filed: 04/30/17 22:26>





*DC/Admit/Observation/Transfer





- Attestations


Scribe Attestion: 





04/29/17 23:50





Documentation prepared by DARWIN Wolfe, acting as medical scribe for 

Aleja Avila MD.





<Rebecca Gonsalves - Last Filed: 04/29/17 23:49>





<Aleja Avila - Last Filed: 04/30/17 02:00>





- Discharge Dispostion


Admit: No





<Aleyda Bey - Last Filed: 04/30/17 22:26>


Diagnosis at time of Disposition: 


 Arthritis, hip, Dehydration





- Discharge Dispostion


Disposition: HOME


Condition at time of disposition: Stable





- Patient Instructions


Printed Discharge Instructions:  DI for Hip Bursitis, DI for Dehydration -- 

Adult

## 2017-04-30 VITALS — HEART RATE: 90 BPM | DIASTOLIC BLOOD PRESSURE: 95 MMHG | SYSTOLIC BLOOD PRESSURE: 120 MMHG

## 2017-04-30 LAB
ALBUMIN SERPL-MCNC: 3.3 G/DL (ref 3.4–5)
ALP SERPL-CCNC: 102 U/L (ref 45–117)
ALT SERPL-CCNC: 18 U/L (ref 12–78)
ANION GAP SERPL CALC-SCNC: 13 MMOL/L (ref 8–16)
APPEARANCE UR: CLEAR
AST SERPL-CCNC: 23 U/L (ref 15–37)
BASOPHILS # BLD: 0.6 % (ref 0–2)
BILIRUB SERPL-MCNC: 0.6 MG/DL (ref 0.2–1)
BILIRUB UR STRIP.AUTO-MCNC: NEGATIVE MG/DL
CALCIUM SERPL-MCNC: 8.7 MG/DL (ref 8.5–10.1)
CO2 SERPL-SCNC: 23 MMOL/L (ref 21–32)
COCKROFT - GAULT: 89.39
COLOR UR: (no result)
CREAT SERPL-MCNC: 0.4 MG/DL (ref 0.55–1.02)
DEPRECATED RDW RBC AUTO: 13.3 % (ref 11.6–15.6)
EOSINOPHIL # BLD: 1.6 % (ref 0–4.5)
GLUCOSE SERPL-MCNC: 89 MG/DL (ref 74–106)
KETONES UR QL STRIP: NEGATIVE
LEUKOCYTE ESTERASE UR QL STRIP.AUTO: NEGATIVE
MCH RBC QN AUTO: 29.3 PG (ref 25.7–33.7)
MCHC RBC AUTO-ENTMCNC: 32.7 G/DL (ref 32–36)
MCV RBC: 89.6 FL (ref 80–96)
NEUTROPHILS # BLD: 62.8 % (ref 42.8–82.8)
NITRITE UR QL STRIP: NEGATIVE
PH UR: 7 [PH] (ref 5–8)
PLATELET # BLD AUTO: 327 K/MM3 (ref 134–434)
PMV BLD: 7 FL (ref 7.5–11.1)
PROT SERPL-MCNC: 6.5 G/DL (ref 6.4–8.2)
PROT UR QL STRIP: NEGATIVE
PROT UR QL STRIP: NEGATIVE
RBC # BLD AUTO: 1 /HPF (ref 0–3)
RBC # UR STRIP: (no result) /UL
SP GR UR: 1 (ref 1–1.03)
UROBILINOGEN UR STRIP-MCNC: NEGATIVE E.U./DL (ref 0.2–1)
WBC # BLD AUTO: 7.7 K/MM3 (ref 4–10)
WBC # UR AUTO: <1 /HPF (ref 3–5)

## 2018-01-16 ENCOUNTER — HOSPITAL ENCOUNTER (EMERGENCY)
Dept: HOSPITAL 74 - JER | Age: 83
LOS: 1 days | Discharge: HOME | End: 2018-01-17
Payer: COMMERCIAL

## 2018-01-16 VITALS — BODY MASS INDEX: 19.7 KG/M2

## 2018-01-16 VITALS — TEMPERATURE: 98.7 F

## 2018-01-16 DIAGNOSIS — M54.5: Primary | ICD-10-CM

## 2018-01-16 DIAGNOSIS — E07.9: ICD-10-CM

## 2018-01-16 DIAGNOSIS — M48.9: ICD-10-CM

## 2018-01-16 DIAGNOSIS — E78.00: ICD-10-CM

## 2018-01-16 LAB
ALBUMIN SERPL-MCNC: 3.4 G/DL (ref 3.4–5)
ALP SERPL-CCNC: 118 U/L (ref 45–117)
ALT SERPL-CCNC: 15 U/L (ref 12–78)
ANION GAP SERPL CALC-SCNC: 12 MMOL/L (ref 8–16)
AST SERPL-CCNC: 24 U/L (ref 15–37)
BASOPHILS # BLD: 0.6 % (ref 0–2)
BILIRUB SERPL-MCNC: 0.8 MG/DL (ref 0.2–1)
BUN SERPL-MCNC: 13 MG/DL (ref 7–18)
CALCIUM SERPL-MCNC: 8.4 MG/DL (ref 8.5–10.1)
CHLORIDE SERPL-SCNC: 98 MMOL/L (ref 98–107)
CO2 SERPL-SCNC: 23 MMOL/L (ref 21–32)
CREAT SERPL-MCNC: 0.6 MG/DL (ref 0.55–1.02)
DEPRECATED RDW RBC AUTO: 13.3 % (ref 11.6–15.6)
EOSINOPHIL # BLD: 2.9 % (ref 0–4.5)
GLUCOSE SERPL-MCNC: 106 MG/DL (ref 74–106)
HCT VFR BLD CALC: 39.3 % (ref 32.4–45.2)
HGB BLD-MCNC: 13.4 GM/DL (ref 10.7–15.3)
LYMPHOCYTES # BLD: 17.5 % (ref 8–40)
MCH RBC QN AUTO: 30.4 PG (ref 25.7–33.7)
MCHC RBC AUTO-ENTMCNC: 34.1 G/DL (ref 32–36)
MCV RBC: 89.1 FL (ref 80–96)
MONOCYTES # BLD AUTO: 9.4 % (ref 3.8–10.2)
NEUTROPHILS # BLD: 69.6 % (ref 42.8–82.8)
PLATELET # BLD AUTO: 317 K/MM3 (ref 134–434)
PMV BLD: 7.1 FL (ref 7.5–11.1)
POTASSIUM SERPLBLD-SCNC: 4.1 MMOL/L (ref 3.5–5.1)
PROT SERPL-MCNC: 7 G/DL (ref 6.4–8.2)
RBC # BLD AUTO: 4.41 M/MM3 (ref 3.6–5.2)
SODIUM SERPL-SCNC: 133 MMOL/L (ref 136–145)
WBC # BLD AUTO: 7.6 K/MM3 (ref 4–10)

## 2018-01-16 PROCEDURE — 3E0333Z INTRODUCTION OF ANTI-INFLAMMATORY INTO PERIPHERAL VEIN, PERCUTANEOUS APPROACH: ICD-10-PCS

## 2018-01-16 NOTE — PDOC
*Physical Exam





- Vital Signs


 Last Vital Signs











Temp Pulse Resp BP Pulse Ox


 


 98.7 F   67   18   136/74   96 


 


 01/16/18 20:20  01/16/18 20:20  01/16/18 20:20  01/16/18 20:20  01/16/18 20:20














ED Treatment Course





- LABORATORY


CBC & Chemistry Diagram: 


 01/16/18 23:00





 01/16/18 23:00





- ADDITIONAL ORDERS


Additional order review: 


 











  01/16/18





  23:00


 


RBC  4.41


 


MCV  89.1


 


MCHC  34.1


 


RDW  13.3


 


MPV  7.1 L


 


Neutrophils %  69.6


 


Lymphocytes %  17.5  D


 


Monocytes %  9.4


 


Eosinophils %  2.9  D


 


Basophils %  0.6














- Medications


Given in the ED: 


ED Medications














Discontinued Medications














Generic Name Dose Route Start Last Admin





  Trade Name Warner  PRN Reason Stop Dose Admin


 


Ketorolac Tromethamine  30 mg  01/16/18 21:29  01/16/18 23:00





  Toradol Injection -  IVPUSH  01/16/18 21:30  30 mg





  ONCE ONE   Administration














Medical Decision Making





- Medical Decision Making





01/16/18 23:21


agree with care from TARA Carrasco





*DC/Admit/Observation/Transfer


Diagnosis at time of Disposition: 


 Severe back pain








- Discharge Dispostion


Disposition: HOME


Condition at time of disposition: Stable





- Prescriptions


Prescriptions: 


Naproxen 250 mg PO BID #10 tablet





- Referrals


Referrals: 


Catrachita Pineda MD [Primary Care Provider] - 





- Patient Instructions


Printed Discharge Instructions:  DI for Low Back Pain


Additional Instructions: 


Please follow up with Dr. Pineda for continued management of your back pain.  

You may need to see orthopedics for an MRI of your spine for further 

evaluation.  If you develop any loss of bowel or bladder function, loss of 

sensation to your legs, or are unable to walk due to pain, please return to the 

ER. 





- Post Discharge Activity

## 2018-01-16 NOTE — PDOC
History of Present Illness





- General


Chief Complaint: Back Pain


Stated Complaint: BACK PAIN


Time Seen by Provider: 01/16/18 20:32





- History of Present Illness


Initial Comments: 





01/16/18 21:02


CHIEF COMPLAINT: low back pain 





HISTORY OF PRESENT ILLNESS: 89 yo F with hx of thyroid disease, 

hypercholesteremia, multiple spine injury (s/p laminectomy in 70s) who presents 

to the ED with complaint of severe lower back pain.  Patient reports she woke 

up with pain this morning and any attempt to sit up or stand causes unbearable 

10/10 pain. Patient is fully alert and oriented and reports that "sometimes in 

December", she was in the bathroom when she fell and hit her head on the sink 

and lost consciousness and woke up on the ground.  She reports that at that 

time ""I had a big bruise on the right side of my forehead, but I didn't want 

to bother anyone in the middle of the night so I dragged myself back into my 

bed."  She reports that she seemed to do better over the past few weeks but 

today suddenly woke up with this pain and "I don't know what I did in my sleep.

"
She denies any loss of sensation to her lower extremities or loss of bowel or 

bladder function.  She denies any chest pain, shortness of breath, headache, 

loss of sensation, or vertigo.  





PAST MEDICAL HISTORY: Denies past medical history





FAMILY HISTORY: Denies





SOCIAL HISTORY: Denies tobacco, alcohol, illicit drug use. 





SURGICAL HISTORY: Denies





ALLERGIES: sulfa, acetaminophen, oxycodone





REVIEW OF SYSTEMS


General/Constitutional: Denies fever or chills. Denies weakness, weight change.





HEENT: Denies change in vision. Denies ear pain or discharge. Denies sore 

throat.





Cardiovascular: Denies chest pain or shortness of breath.





Respiratory: Denies cough, wheezing, or hemoptysis.





Gastrointestinal: Denies nausea, vomiting, diarrhea or constipation. Denies 

rectal bleeding.





Genitourinary: Denies dysuria, frequency, or change in urination.





Musculoskeletal: Denies joint or muscle swelling or pain. Denies neck or back 

pain.





Skin and breasts: Denies rash or easy bruising.





Neurologic: Denies headache, vertigo, loss of consciousness, or loss of 

sensation.





PHYSICAL EXAM


General Appearance: Well-appearing, appropriately dressed.  No apparent 

distress.





HEENT: EOMI, PERRLA, normal ENT inspection, normal voice, TMs normal, pharynx 

normal.  No conjunctival pallor.  No photophobia, scleral icterus.





Neck: Supple.  Trachea midline. No tenderness, rigidity, carotid bruit, stridor

, lymphadenopathy, or thyromegaly. 





Respiratory/Chest: Lungs CTAB.  No shortness of breath, chest tenderness, 

respiratory distress, accessory muscle use. No crackles, rales, rhonchi, stridor

, wheezing, dullness





Cardiovascular: RRR. S1, S2.  No JVD, murmur, bradycardia, tachycardia.





Vascular Pulses: Dorsalis-Pedis (R): 2+, Dorsalis-Pedis (L): 2+





Gastrointestinal/Abdominal: Normal bowel sounds.  Abdomen soft, non-distended.  

No tenderness or rebound tenderness. No  organomegaly, pulsatile mass, guarding

, hernia, hepatomegaly, splenomegaly.





Musculoskeletal/Extremities: Reproducible pain to lower back with movement, 

patient unable to sit up for exam secondary to severe pain. Sensory 

discrimnation intact to b/l lower extremities. Strength 5/5 to b/l lower 

extremities.  FROM of all extremities, normal capillary refill.  Pelvis Stable.

  No CVA tenderness. No tenderness to extremities, pedal edema, swelling, 

erythema or deformity.





Integumentary: Appropriate color, dry, warm.  No cyanosis, erythema, jaundice 

or rash





Neurologic: CNs II-XII intact. Fully oriented, alert.  Appropriate mood/affect. 

Motor strength 5/5.  No appreciable EOM palsy, facial droop or sensory deficit.








Past History





- Past Medical History


Allergies/Adverse Reactions: 


 Allergies











Allergy/AdvReac Type Severity Reaction Status Date / Time


 


Sulfa (Sulfonamide Allergy   Verified 01/16/18 20:14





Antibiotics)     


 


acetaminophen [From Tylenol] AdvReac   Verified 01/16/18 20:14


 


oxycodone HCl AdvReac  Vomiting Verified 01/16/18 20:14





[From Roxicodone]     











Home Medications: 


Ambulatory Orders





Aspirin [ASA -] 81 mg PO DAILY 12/14/14 


Polyethylene Glycol 3350 [Miralax 119 gm Btl -] 17 gm PO DAILY PRN 30 Days  

bottle 01/23/15 


Calcium 500Mg/Vit-D 200 Units [Os-Vito 500+D -] 1 combo PO DAILY #0 tablet 04/14/

15 


Zolpidem Tartrate [Ambien] 10 mg PO HS PRN #0 tablet 04/14/15 


Gabapentin 0 mg PO BID 04/22/17 


Propranolol HCl [Inderal -] 20 mg PO TID 04/22/17 


Alendronate Na [Fosamax] 70 mg PO Q7D 01/16/18 


Naproxen 250 mg PO BID #10 tablet 01/17/18 








Anemia: No


Asthma: No


Cancer: No


Cardiac Disorders: Yes (PALPITATIONS)


CVA: No


COPD: No


CHF: No


Dementia: No


Diabetes: No


 Disorders: No


HTN: No


Hypercholesterolemia: Yes


Liver Disease: No


Seizures: No


Thyroid Disease: Yes





- Surgical History


Abdominal Surgery: Yes


Appendectomy: Yes


Cardiac Surgery: No


Lung Surgery: No


Neurologic Surgery: Yes (laminectomy)


Orthopedic Surgery: No





- Suicide/Smoking/Psychosocial Hx


Smoking Status: No


Smoking History: Never smoked


Have you smoked in the past 12 months: No


Number of Cigarettes Smoked Daily: 0


Hx Alcohol Use: No


Drug/Substance Use Hx: No


Substance Use Type: None


Hx Substance Use Treatment: No





Trauma Specific PMHX





- Complaint Specific PMHX


Back Injury: Yes





*Physical Exam





- Vital Signs


 Last Vital Signs











Temp Pulse Resp BP Pulse Ox


 


 98.7 F   67   18   136/74   96 


 


 01/16/18 20:20  01/16/18 20:20  01/16/18 20:20  01/16/18 20:20  01/16/18 20:20














ED Treatment Course





- LABORATORY


CBC & Chemistry Diagram: 


 01/16/18 23:00





 01/16/18 23:00





Medical Decision Making





- Medical Decision Making





01/16/18 21:26


89 yo F with hx of thyroid disease, hypercholesteremia, multiple spine injury (s

/p laminectomy in 70s) who presents to the ED with complaint of severe lower 

back pain.





-CBC, CMP, UA, UCx


-EKG


-Lumbar spine CT, r/o acute fracture.  Given hx of fall and LOC will also order 

head CT. 


-Toradol 30 mg IV





01/16/18 23:55


Patient reassessed, at this time she reports the pain is controlled and that "I 

can move gingerly and the pain is ok."  She states she prefers to go home on 

pain medication.  











*DC/Admit/Observation/Transfer


Diagnosis at time of Disposition: 


 Severe back pain








- Discharge Dispostion


Disposition: HOME


Condition at time of disposition: Stable


Admit: No





- Prescriptions


Prescriptions: 


Naproxen 250 mg PO BID #10 tablet





- Referrals


Referrals: 


Catrachita Pineda MD [Primary Care Provider] - 





- Patient Instructions


Printed Discharge Instructions:  DI for Low Back Pain


Additional Instructions: 


Please follow up with Dr. Pineda for continued management of your back pain.  

You may need to see orthopedics for an MRI of your spine for further 

evaluation.  If you develop any loss of bowel or bladder function, loss of 

sensation to your legs, or are unable to walk due to pain, please return to the 

ER. 





- Post Discharge Activity

## 2018-01-17 VITALS — DIASTOLIC BLOOD PRESSURE: 74 MMHG | HEART RATE: 73 BPM | SYSTOLIC BLOOD PRESSURE: 132 MMHG

## 2018-01-17 LAB
APPEARANCE UR: (no result)
BILIRUB UR STRIP.AUTO-MCNC: NEGATIVE MG/DL
COLOR UR: YELLOW
KETONES UR QL STRIP: (no result)
LEUKOCYTE ESTERASE UR QL STRIP.AUTO: NEGATIVE
NITRITE UR QL STRIP: NEGATIVE
PH UR: 7 [PH] (ref 5–8)
PROT UR QL STRIP: NEGATIVE
PROT UR QL STRIP: NEGATIVE
RBC # UR STRIP: NEGATIVE /UL
SP GR UR: 1.01 (ref 1–1.03)
UROBILINOGEN UR STRIP-MCNC: (no result) MG/DL (ref 0.2–1)

## 2018-01-19 ENCOUNTER — HOSPITAL ENCOUNTER (INPATIENT)
Dept: HOSPITAL 74 - JER | Age: 83
LOS: 17 days | Discharge: NURSING, REHAB OR LONG-TERM CARE | DRG: 542 | End: 2018-02-05
Attending: FAMILY MEDICINE | Admitting: HOSPITALIST
Payer: COMMERCIAL

## 2018-01-19 VITALS — BODY MASS INDEX: 19.5 KG/M2

## 2018-01-19 DIAGNOSIS — M81.0: ICD-10-CM

## 2018-01-19 DIAGNOSIS — J10.1: ICD-10-CM

## 2018-01-19 DIAGNOSIS — E87.1: ICD-10-CM

## 2018-01-19 DIAGNOSIS — K21.9: ICD-10-CM

## 2018-01-19 DIAGNOSIS — K59.00: ICD-10-CM

## 2018-01-19 DIAGNOSIS — E78.5: ICD-10-CM

## 2018-01-19 DIAGNOSIS — I10: ICD-10-CM

## 2018-01-19 DIAGNOSIS — E43: ICD-10-CM

## 2018-01-19 DIAGNOSIS — J98.11: ICD-10-CM

## 2018-01-19 DIAGNOSIS — M54.5: ICD-10-CM

## 2018-01-19 DIAGNOSIS — M85.80: ICD-10-CM

## 2018-01-19 DIAGNOSIS — R11.0: ICD-10-CM

## 2018-01-19 DIAGNOSIS — I49.8: ICD-10-CM

## 2018-01-19 DIAGNOSIS — M48.55XA: Primary | ICD-10-CM

## 2018-01-19 LAB
ALBUMIN SERPL-MCNC: 3.2 G/DL (ref 3.4–5)
ALP SERPL-CCNC: 107 U/L (ref 45–117)
ALT SERPL-CCNC: 17 U/L (ref 12–78)
ANION GAP SERPL CALC-SCNC: 10 MMOL/L (ref 8–16)
AST SERPL-CCNC: 27 U/L (ref 15–37)
BASOPHILS # BLD: 0.5 % (ref 0–2)
BILIRUB SERPL-MCNC: 0.9 MG/DL (ref 0.2–1)
BUN SERPL-MCNC: 19 MG/DL (ref 7–18)
CALCIUM SERPL-MCNC: 8.5 MG/DL (ref 8.5–10.1)
CHLORIDE SERPL-SCNC: 96 MMOL/L (ref 98–107)
CO2 SERPL-SCNC: 26 MMOL/L (ref 21–32)
CREAT SERPL-MCNC: 0.6 MG/DL (ref 0.55–1.02)
DEPRECATED RDW RBC AUTO: 13.4 % (ref 11.6–15.6)
EOSINOPHIL # BLD: 2.6 % (ref 0–4.5)
GLUCOSE SERPL-MCNC: 97 MG/DL (ref 74–106)
HCT VFR BLD CALC: 39.3 % (ref 32.4–45.2)
HGB BLD-MCNC: 13.2 GM/DL (ref 10.7–15.3)
LYMPHOCYTES # BLD: 17.5 % (ref 8–40)
MCH RBC QN AUTO: 30.1 PG (ref 25.7–33.7)
MCHC RBC AUTO-ENTMCNC: 33.6 G/DL (ref 32–36)
MCV RBC: 89.6 FL (ref 80–96)
MONOCYTES # BLD AUTO: 9.9 % (ref 3.8–10.2)
NEUTROPHILS # BLD: 69.5 % (ref 42.8–82.8)
PLATELET # BLD AUTO: 329 K/MM3 (ref 134–434)
PMV BLD: 7.3 FL (ref 7.5–11.1)
POTASSIUM SERPLBLD-SCNC: 4.1 MMOL/L (ref 3.5–5.1)
PROT SERPL-MCNC: 6.8 G/DL (ref 6.4–8.2)
RBC # BLD AUTO: 4.39 M/MM3 (ref 3.6–5.2)
SODIUM SERPL-SCNC: 132 MMOL/L (ref 136–145)
WBC # BLD AUTO: 8.2 K/MM3 (ref 4–10)

## 2018-01-19 PROCEDURE — G0378 HOSPITAL OBSERVATION PER HR: HCPCS

## 2018-01-19 NOTE — PDOC
History of Present Illness





- General


History Source: Patient


Exam Limitations: No Limitations





- History of Present Illness


Initial Comments: 





01/19/18 22:56


 The patient is an 88 year old female with past medical history of thyroid 

disease, hyperlipidemia, and spine injury (s/p laminectomy in 70s) who presents 

to the ED with complaints of lower back pain that began today. The patient 

rates the pain 10/10 and states it is a sharp pain that spans across her entire 

lower back and is making it difficult to position herself or walk. She states 

it does not radiate anywhere. She reports laying in bed when the pain began as 

she went to turn from her side to her back, and having to call her neighbor for 

help. She reports taking oxycodone for her pain today which she states she 

doesnt normally take because it upsets her stomach. The patient was seen in the 

ED two days ago for a similar episode of pain in which she was given naproxen. 

She states it helped mask the pain, but it did not go away at all. The patient 

denies any loss of sensation in her lower extremities, or any bladder or 

urinary incontinence. She denies any fevers or chills. 





<Indu Amador - Last Filed: 01/19/18 22:56>





<Byron De Los Santos - Last Filed: 01/20/18 01:25>





- General


Chief Complaint: Chronic pain


Stated Complaint: BACK PAIN


Time Seen by Provider: 01/19/18 21:29





Past History





<Indu Amador - Last Filed: 01/19/18 22:56>





- Past Medical History


Anemia: No


Asthma: No


Cancer: No


Cardiac Disorders: Yes (PALPITATIONS)


CVA: No


COPD: No


CHF: No


Dementia: No


Diabetes: No


 Disorders: No


HTN: No


Hypercholesterolemia: Yes


Liver Disease: No


Seizures: No


Thyroid Disease: Yes





- Surgical History


Abdominal Surgery: Yes


Appendectomy: Yes


Cardiac Surgery: No


Lung Surgery: No


Neurologic Surgery: Yes (laminectomy)


Orthopedic Surgery: No





- Suicide/Smoking/Psychosocial Hx


Smoking Status: No


Smoking History: Never smoked


Have you smoked in the past 12 months: No


Number of Cigarettes Smoked Daily: 0


Hx Alcohol Use: No


Drug/Substance Use Hx: No


Substance Use Type: None


Hx Substance Use Treatment: No





<Byron De Los Santos - Last Filed: 01/20/18 01:25>





- Past Medical History


Allergies/Adverse Reactions: 


 Allergies











Allergy/AdvReac Type Severity Reaction Status Date / Time


 


Sulfa (Sulfonamide Allergy   Verified 01/19/18 21:40





Antibiotics)     


 


acetaminophen [From Tylenol] AdvReac   Verified 01/19/18 21:40


 


oxycodone HCl AdvReac  Vomiting Verified 01/19/18 21:40





[From Roxicodone]     











Home Medications: 


Ambulatory Orders





Aspirin [ASA -] 81 mg PO DAILY 12/14/14 


Polyethylene Glycol 3350 [Miralax 119 gm Btl -] 17 gm PO DAILY PRN 30 Days  

bottle 01/23/15 


Calcium 500Mg/Vit-D 200 Units [Os-Vito 500+D -] 1 combo PO DAILY #0 tablet 04/14/

15 


Zolpidem Tartrate [Ambien] 10 mg PO HS PRN #0 tablet 04/14/15 


Gabapentin 0 mg PO BID 04/22/17 


Propranolol HCl [Inderal -] 20 mg PO TID 04/22/17 


Alendronate Na [Fosamax] 70 mg PO Q7D 01/16/18 


Naproxen 250 mg PO BID #10 tablet 01/17/18 











**Review of Systems





- Review of Systems


Able to Perform ROS?: Yes


Comments:: 





01/19/18 22:57


A complete review of 10 out of 10 review of systems is taken and is negative 

apart from what is previously mentioned below and in the HPI.





All Other Systems: Reviewed and Negative





<Indu Amador - Last Filed: 01/19/18 22:56>





*Physical Exam





- Vital Signs


 Last Vital Signs











Temp Pulse Resp BP Pulse Ox


 


 98.5 F   80   19   158/86   98 


 


 01/19/18 21:36  01/19/18 21:36  01/19/18 21:36  01/19/18 21:36  01/19/18 21:36














- Physical Exam


Comments: 





01/19/18 22:57


Vitals: Triage Vital signs reviewed


General Appearance:  no acute distress, well nourished well developed,


Neck:  Supple;No Nuchal rigidity


Cardiac: Regular rate and rhythm, no murmurs, no rubs, no gallops,


Lungs: Clear to auscultation bilateral, good air movement bilaterally,


Abdomen:  Soft, nondistended, normal bowel sounds, nontender to palpation


Extremities: Full range of motion to all extremities, no cyanosis, clubbing, or 

edema


Back: Lumbar spine tenderness to palpation 


Skin:  Warm and dry, no rashes or lesions, no petechiae


Neuro: AOX3; Cranial Nerves 2-12 grossly intact, Strength intact to all 

extremities, Sensation intact to all extremities


Psych:  normal mood, normal affect








<Indu Amador - Last Filed: 01/19/18 22:56>





- Vital Signs


 Last Vital Signs











Temp Pulse Resp BP Pulse Ox


 


 98.5 F   80   19   158/86   98 


 


 01/19/18 21:36  01/19/18 21:36  01/19/18 21:36  01/19/18 21:36  01/19/18 21:36














<Byron De Los Santos - Last Filed: 01/20/18 01:25>





ED Treatment Course





- LABORATORY


CBC & Chemistry Diagram: 


 01/19/18 22:39





 01/19/18 22:39





<Indu Amador - Last Filed: 01/19/18 22:56>





- LABORATORY


CBC & Chemistry Diagram: 


 01/19/18 22:39





 01/19/18 22:39





<Byron De Los Santos - Last Filed: 01/20/18 01:25>





Medical Decision Making





- Medical Decision Making





01/20/18 01:25





Patient with long-standing history of chronic back pain multiple vertebral 

fractures turned while in bed experiencing excruciating back pain 

uncontrollable home pain medications





Here in the emergency department imaging as dictated. Given persistence of pain 

in patient lives by herself requiring IV pain medication will observe overnight 

for pain control and possible rehabilitation versus pain consultation








<Byron De Los Santos - Last Filed: 01/20/18 01:25>





*DC/Admit/Observation/Transfer





- Attestations


Scribe Attestion: 





01/19/18 22:59


Documentation prepared by Indu Amador, acting as medical scribe for Byron De Los Santos MD.





<Indu Amador - Last Filed: 01/19/18 22:56>





- Discharge Dispostion


Admit: Yes





<Byron De Los Santos - Last Filed: 01/20/18 01:25>


Diagnosis at time of Disposition: 


Back pain


Qualifiers:


 Back pain location: low back pain Chronicity: chronic Back pain laterality: 

bilateral Sciatica presence: without sciatica Qualified Code(s): M54.5 - Low 

back pain

## 2018-01-20 LAB
ANION GAP SERPL CALC-SCNC: 13 MMOL/L (ref 8–16)
BASOPHILS # BLD: 0.7 % (ref 0–2)
BUN SERPL-MCNC: 16 MG/DL (ref 7–18)
CALCIUM SERPL-MCNC: 7.8 MG/DL (ref 8.5–10.1)
CHLORIDE SERPL-SCNC: 101 MMOL/L (ref 98–107)
CO2 SERPL-SCNC: 22 MMOL/L (ref 21–32)
CREAT SERPL-MCNC: 0.5 MG/DL (ref 0.55–1.02)
DEPRECATED RDW RBC AUTO: 13.3 % (ref 11.6–15.6)
EOSINOPHIL # BLD: 3.3 % (ref 0–4.5)
GLUCOSE SERPL-MCNC: 79 MG/DL (ref 74–106)
HCT VFR BLD CALC: 38.8 % (ref 32.4–45.2)
HGB BLD-MCNC: 12.9 GM/DL (ref 10.7–15.3)
LYMPHOCYTES # BLD: 14.9 % (ref 8–40)
MCH RBC QN AUTO: 29.7 PG (ref 25.7–33.7)
MCHC RBC AUTO-ENTMCNC: 33.3 G/DL (ref 32–36)
MCV RBC: 89.2 FL (ref 80–96)
MONOCYTES # BLD AUTO: 11 % (ref 3.8–10.2)
NEUTROPHILS # BLD: 70.1 % (ref 42.8–82.8)
PLATELET # BLD AUTO: 323 K/MM3 (ref 134–434)
PMV BLD: 7.2 FL (ref 7.5–11.1)
POTASSIUM SERPLBLD-SCNC: 3.9 MMOL/L (ref 3.5–5.1)
RBC # BLD AUTO: 4.35 M/MM3 (ref 3.6–5.2)
SODIUM SERPL-SCNC: 136 MMOL/L (ref 136–145)
WBC # BLD AUTO: 7.1 K/MM3 (ref 4–10)
WBC NRBC COR # BLD: 7.1 K/MM3

## 2018-01-20 RX ADMIN — Medication SCH TAB: at 12:18

## 2018-01-20 RX ADMIN — LIDOCAINE SCH PATCH: 50 PATCH TOPICAL at 12:19

## 2018-01-20 RX ADMIN — CALCITONIN SALMON SCH UNITS: 200 SPRAY, METERED NASAL at 12:23

## 2018-01-20 RX ADMIN — ASPIRIN 81 MG SCH MG: 81 TABLET ORAL at 12:18

## 2018-01-20 RX ADMIN — Medication SCH EACH: at 22:11

## 2018-01-20 RX ADMIN — MORPHINE SULFATE PRN MG: 10 INJECTION, SOLUTION INTRAMUSCULAR; INTRAVENOUS at 18:46

## 2018-01-20 RX ADMIN — GABAPENTIN SCH MG: 100 CAPSULE ORAL at 12:18

## 2018-01-20 RX ADMIN — GABAPENTIN SCH MG: 100 CAPSULE ORAL at 22:10

## 2018-01-20 NOTE — CONSULT
Consult - text type





- Consultation


Consultation Note: 





 Neurology








History of Present Illness


The patient is an 88 year old female with past medical history of thyroid 

disease, hyperlipidemia, and spine injury (s/p laminectomy in 70s) who presents 

to the ED with complaints of lower back pain which has improved but main 

complaint was neuropathy in distal lower extremities. The patient rates the 

pain 10/10 and states it is a sharp pain that spans across her entire lower 

back and is making it difficult to position herself or walk. She states it does 

not radiate anywhere. She reports laying in bed when the pain began as she went 

to turn from her side to her back, and having to call her neighbor for help. 

She reports taking oxycodone for her pain which she states she doesnt normally 

take because it upsets her stomach. The patient was seen in the ED two days ago 

for a similar episode of pain in which she was given naproxen. She states it 

helped mask the pain, but it did not go away at all. CT L spine complete and 

reviewed, awaiting report. The patient denies any loss of sensation in her 

lower extremities, or any bladder or urinary incontinence. She denies any 

fevers or chills. 





Past History





- Past Medical History


Anemia: No


Asthma: No


Cancer: No


Cardiac Disorders: Yes (PALPITATIONS)


CVA: No


COPD: No


CHF: No


Dementia: No


Diabetes: No


 Disorders: No


HTN: No


Hypercholesterolemia: Yes


Liver Disease: No


Seizures: No


Thyroid Disease: Yes





- Surgical History


Abdominal Surgery: Yes


Appendectomy: Yes


Cardiac Surgery: No


Lung Surgery: No


Neurologic Surgery: Yes (laminectomy)


Orthopedic Surgery: No





- Suicide/Smoking/Psychosocial Hx


Smoking Status: No


Smoking History: Never smoked


Have you smoked in the past 12 months: No


Number of Cigarettes Smoked Daily: 0


Hx Alcohol Use: No


Drug/Substance Use Hx: No


Substance Use Type: None


Hx Substance Use Treatment: No





- Past Medical History


Allergies/Adverse Reactions: 


 Allergies











Allergy/AdvReac Type Severity Reaction Status Date / Time


 


Sulfa (Sulfonamide Allergy   Verified 01/19/18 21:40





Antibiotics)     


 


acetaminophen [From Tylenol] AdvReac   Verified 01/19/18 21:40


 


oxycodone HCl AdvReac  Vomiting Verified 01/19/18 21:40





[From Roxicodone]     











Active Medications





Aspirin (Asa -)  81 mg PO DAILY [facility]


   Last Admin: 01/20/18 12:18 Dose:  81 mg


Calcitonin (Miacalcin Spray -)  200 units NS DAILY [facility]


   Last Admin: 01/20/18 12:23 Dose:  200 units


Calcium Carbonate/Cholecalciferol (Os-Vito 500+D -)  1 tab PO DAILY [facility]


   Last Admin: 01/20/18 12:18 Dose:  1 tab


Gabapentin (Neurontin -)  100 mg PO BID [facility]


   Last Admin: 01/20/18 12:18 Dose:  100 mg


Lidocaine (Lidoderm Patch -)  1 patch TP DAILY [facility]


   Last Admin: 01/20/18 12:19 Dose:  1 patch


Miscellaneous (Lidoderm Patch Removal)  1 each MC DAILY@2200 [facility]


Morphine Sulfate (Morphine Injection -)  1 mg IVPUSH Q4H PRN


   PRN Reason: PAIN LEVEL 6-10


   Last Admin: 01/20/18 10:23 Dose:  1 mg


Morphine Sulfate (Morphine Injection -)  2 mg IVPUSH Q6H-IV PRN


   PRN Reason: PAIN LEVEL 4 - 6


Ondansetron HCl (Zofran Injection)  4 mg IVPUSH Q6H PRN


   PRN Reason: NAUSEA AND/OR VOMITING


Polyethylene Glycol (Miralax (For Daily Use) -)  17 gm PO DAILY PRN


   PRN Reason: CONSTIPATION


Propranolol HCl (Inderal -)  20 mg PO TID [facility]


Zolpidem Tartrate (Ambien -)  5 mg PO HS PRN


   PRN Reason: INSOMNIA














**Review of Systems





- Review of Systems


Able to Perform ROS?: Yes


Comments:: 





A complete review of 10 out of 10 review of systems is taken and is negative 

apart from what is previously mentioned below and in the HPI.





All Other Systems: Reviewed and Negative





*Physical Exam


 Vital Signs











Temperature  98 F   01/20/18 10:29


 


Pulse Rate  91 H  01/20/18 10:29


 


Respiratory Rate  18   01/20/18 10:29


 


Blood Pressure  141/90   01/20/18 10:29


 


O2 Sat by Pulse Oximetry (%)  98   01/20/18 00:00











General Appearance:  no acute distress, well nourished well developed,


Neck:  Supple;No Nuchal rigidity


Cardiac: Regular rate and rhythm, no murmurs, no rubs, no gallops,


Lungs: Clear to auscultation bilateral, good air movement bilaterally,


Abdomen:  Soft, nondistended, normal bowel sounds, nontender to palpation


Extremities: Full range of motion to all extremities, no cyanosis, clubbing, or 

edema


Back: Lumbar spine tenderness to palpation 


Skin:  Warm and dry, no rashes or lesions, no petechiae


Neuro: AOX3; Cranial Nerves 2-12 grossly intact, Strength intact to all 

extremities, Sensation intact to all extremities


Psych:  normal mood, normal affect





 CBCD











WBC  7.1 K/mm3 (4.0-10.0)   01/20/18  06:10    


 


RBC  4.35 M/mm3 (3.60-5.2)   01/20/18  06:10    


 


Hgb  12.9 GM/dL (10.7-15.3)   01/20/18  06:10    


 


Hct  38.8 % (32.4-45.2)   01/20/18  06:10    


 


MCV  89.2 fl (80-96)   01/20/18  06:10    


 


MCHC  33.3 g/dl (32.0-36.0)   01/20/18  06:10    


 


RDW  13.3 % (11.6-15.6)   01/20/18  06:10    


 


Plt Count  323 K/MM3 (134-434)   01/20/18  06:10    


 


MPV  7.2 fl (7.5-11.1)  L  01/20/18  06:10    








 CMP











Sodium  136 mmol/L (136-145)   01/20/18  06:10    


 


Potassium  3.9 mmol/L (3.5-5.1)   01/20/18  06:10    


 


Chloride  101 mmol/L ()   01/20/18  06:10    


 


Carbon Dioxide  22 mmol/L (21-32)   01/20/18  06:10    


 


Anion Gap  13  (8-16)   01/20/18  06:10    


 


BUN  16 mg/dL (7-18)   01/20/18  06:10    


 


Creatinine  0.5 mg/dL (0.55-1.02)  L  01/20/18  06:10    


 


Creat Clearance w eGFR  > 60  (>60)   01/19/18  22:39    


 


Calcium  7.8 mg/dL (8.5-10.1)  L  01/20/18  06:10    


 


Total Bilirubin  0.9 mg/dL (0.2-1.0)   01/19/18  22:39    


 


AST  27 U/L (15-37)   01/19/18  22:39    


 


ALT  17 U/L (12-78)   01/19/18  22:39    


 


Alkaline Phosphatase  107 U/L ()   01/19/18  22:39    


 


Total Protein  6.8 g/dl (6.4-8.2)   01/19/18  22:39    


 


Albumin  3.2 g/dl (3.4-5.0)  L  01/19/18  22:39    











Plan:


88 year old female with past medical history of thyroid disease, hyperlipidemia

, and spine injury (s/p laminectomy in 70s) who presents to the ED with 

complaints of lower back pain which has improved but main complaint was 

neuropathy in distal lower extremities. The patient rates the pain 10/10 and 

states it is a sharp pain that spans across her entire lower back and is making 

it difficult to position herself or walk. She states it does not radiate 

anywhere. She reports laying in bed when the pain began as she went to turn 

from her side to her back, and having to call her neighbor for help. She 

reports taking oxycodone for her pain which she states she doesnt normally take 

because it upsets her stomach. The patient was seen in the ED two days ago for 

a similar episode of pain in which she was given naproxen. She states it helped 

mask the pain, but it did not go away at all. CT L spine complete and reviewed, 

awaiting report. The patient denies any loss of sensation in her lower 

extremities, or any bladder or urinary incontinence. She denies any fevers or 

chills. Follow up offical CT result. Started her on low dose gabapentin, 100mg 

twice daily. Monitor for sedation. Continue pain mgmt, caution with opiods. 

Fall precautions.

## 2018-01-20 NOTE — PN
Progress Note, Physician


Chief Complaint: 





Intractable back pain


History of Present Illness: 





Pain management


seen by Neurology


started on Gabapentin





- Current Medication List


Current Medications: 


Active Medications





Aspirin (Asa -)  81 mg PO DAILY [facility]


   Last Admin: 01/20/18 12:18 Dose:  81 mg


Calcitonin (Miacalcin Spray -)  200 units NS DAILY [facility]


   Last Admin: 01/20/18 12:23 Dose:  200 units


Calcium Carbonate/Cholecalciferol (Os-Vito 500+D -)  1 tab PO DAILY [facility]


   Last Admin: 01/20/18 12:18 Dose:  1 tab


Gabapentin (Neurontin -)  100 mg PO BID [facility]


   Last Admin: 01/20/18 12:18 Dose:  100 mg


Lidocaine (Lidoderm Patch -)  1 patch TP DAILY [facility]


   Last Admin: 01/20/18 12:19 Dose:  1 patch


Miscellaneous (Lidoderm Patch Removal)  1 each MC DAILY@2200 [facility]


Morphine Sulfate (Morphine Injection -)  2 mg IVPUSH Q6H-IV PRN


   PRN Reason: PAIN LEVEL 4 - 6


Ondansetron HCl (Zofran Injection)  4 mg IVPUSH Q6H PRN


   PRN Reason: NAUSEA AND/OR VOMITING


Polyethylene Glycol (Miralax (For Daily Use) -)  17 gm PO DAILY PRN


   PRN Reason: CONSTIPATION


Propranolol HCl (Inderal -)  20 mg PO TID [facility]


   Last Admin: 01/20/18 14:33 Dose:  Not Given


Zolpidem Tartrate (Ambien -)  5 mg PO HS PRN


   PRN Reason: INSOMNIA











- Objective


Vital Signs: 


 Vital Signs











Temperature  99.2 F   01/20/18 14:19


 


Pulse Rate  112 H  01/20/18 14:19


 


Respiratory Rate  18   01/20/18 14:19


 


Blood Pressure  114/87   01/20/18 14:19


 


O2 Sat by Pulse Oximetry (%)  98   01/20/18 00:00











Constitutional: Yes: Well Nourished, No Distress, Calm


Cardiovascular: Yes: Regular Rate and Rhythm


Respiratory: Yes: Regular


Musculoskeletal: Yes: Back Pain


Extremities: Yes: WNL


Edema: No


Peripheral Pulses WNL: Yes


Neurological: Yes: Alert, Oriented


Psychiatric: Yes: Alert, Oriented


Labs: 


 CBC, BMP





 01/20/18 06:10 





 01/20/18 06:10 











Problem List





- Problems


(1) Intractable low back pain


Assessment/Plan: 


-Pain management


-Neurology consult appreciated


-rehab





Code(s): M54.5 - LOW BACK PAIN   





(2) Compression fracture of L4 lumbar vertebra


Assessment/Plan: 


awaiting official CT lumbar report


Code(s): S32.040A - WEDGE COMPRESSION FRACTURE OF FOURTH LUMBAR VERTEBRA, INIT 

  





Assessment/Plan





see problem list

## 2018-01-20 NOTE — CONSULT
Consult - text type





- Consultation


Consultation Note: 





Neurosurgery Consult





Patient is a very pleasant 88 year old  female who presents with a 

long history of low back pain including Lumbar decompressive surgery in the 

1970s. She had been doing reasonably well until several weeks ago and she has 

noted progressive pain in the midback and lumbar region with some paresthesias 

into her legs. She presented to the ER on Tuesday and was treated and released 

after initial evaluation. Unfortunately, her pain increased and progressed, and 

she returned to the ER unable to ambulate or change positions due to acute and 

progressive mechanical back pain.





Lumbar CT and MRI reviewed and severe osteopenia with multiple compression 

fractures and disc bulges noted. Although the Lumbar fractures may be chronic, 

there appears to be a new T11 compression fracture.





Although Lumbar degenerative scoliosis is often amenable to surgical 

intervention, the patient's age and medical comorbidities make this a less 

desirable option unless there is no response to aggressive non-operative 

treatment or progression of neurological deficits.





I feel that bracing with a Lumbar corset (similar to Aspen Quickdraw Contour) 

or TLSO would be the logical next step in her care. Osteoporosis treatment may 

also be of value.

## 2018-01-20 NOTE — HP
CHIEF COMPLAINT: Back Pain





PCP: Dr. Pineda





HISTORY OF PRESENT ILLNESS:


This is a 87 y/o woman with a past medical history of Chronic Back Pain, 

Chronic Compression Fracture, Thyrid, HLD. Who presents to the ED with back 

pain. Patient reports severe lower back pain. Patient reports increase pain 

with movement. Patient states" I can't walk or sleep because of the pain, which 

is sharp across my lower back". Patient was seen in the ED 1/18 for same- d/cd 

with Naproxen, now without relief. Patient denies recent fall or trauma. 

Patient denies numbness to her lower extremities. Patient denies bowel or 

bladder incontinence. Patient denies fever, chills, cough, SOB, CP, palpitations

, N/V/D, constipation, dysuria








ER course was notable for:


(1) CT- L- spine- image reviewed, report pending


(2)


(3)





Recent Travel: None





PAST MEDICAL HISTORY:


See HPI





PAST SURGICAL HISTORY:


s/p Laminectomy 70s


Appendectomy





Social History:


Smoking: Never


Alcohol:   None


Drugs:     None


Lives alone





Family History:





Allergies





Sulfa (Sulfonamide Antibiotics) Allergy (Verified 01/19/18 21:40)


 


acetaminophen [From Tylenol] Adverse Reaction (Verified 01/19/18 21:40)


 


 instructed not to take since she has cys on her liver 


oxycodone HCl [From Roxicodone] Adverse Reaction (Verified 01/19/18 21:40)


 Vomiting








HOME MEDICATIONS:


 Home Medications











 Medication  Instructions  Recorded


 


Aspirin [ASA -] 81 mg PO DAILY 12/14/14


 


Polyethylene Glycol 3350 [Miralax 17 gm PO DAILY PRN 30 Days  bottle 01/23/15





119 gm Btl -]  


 


Calcium 500Mg/Vit-D 200 Units 1 combo PO DAILY #0 tablet 04/14/15





[Os-Vito 500+D -]  


 


Zolpidem Tartrate [Ambien] 10 mg PO HS PRN #0 tablet 04/14/15


 


Gabapentin 0 mg PO BID 04/22/17


 


Propranolol HCl [Inderal -] 20 mg PO TID 04/22/17


 


Alendronate Na [Fosamax] 70 mg PO Q7D 01/16/18


 


Naproxen 250 mg PO BID #10 tablet 01/17/18








REVIEW OF SYSTEMS


CONSTITUTIONAL: 


Absent:  fever, chills, diaphoresis, generalized weakness, malaise, loss of 

appetite, weight change


HEENT: 


Absent:  rhinorrhea, nasal congestion, throat pain, throat swelling, difficulty 

swallowing, mouth swelling, ear pain, eye pain, visual changes


CARDIOVASCULAR: 


Absent: chest pain, syncope, palpitations, irregular heart rate, lightheadedness

, peripheral edema


RESPIRATORY: 


Absent: cough, shortness of breath, dyspnea with exertion, orthopnea, wheezing, 

stridor, hemoptysis


GASTROINTESTINAL:


Absent: abdominal pain, abdominal distension, nausea, vomiting, diarrhea, 

constipation, melena, hematochezia


GENITOURINARY: 


Absent: dysuria, frequency, urgency, hesitancy, hematuria, flank pain, genital 

pain


MUSCULOSKELETAL: back pain


Absent: myalgia, arthralgia, joint swelling, neck pain


SKIN: 


Absent: rash, itching, pallor


HEMATOLOGIC/IMMUNOLOGIC: 


Absent: easy bleeding, easy bruising, lymphadenopathy, frequent infections


ENDOCRINE:


Absent: unexplained weight gain, unexplained weight loss, heat intolerance, 

cold intolerance


NEUROLOGIC: 


Absent: headache, focal weakness or paresthesias, dizziness, unsteady gait, 

seizure, mental status changes, bladder or bowel incontinence


PSYCHIATRIC: 


Absent: anxiety, depression, suicidal or homicidal ideation, hallucinations.








PHYSICAL EXAMINATION


 Vital Signs - 24 hr











  01/19/18 01/20/18





  21:36 00:00


 


Temperature 98.5 F 


 


Pulse Rate 80 


 


Pulse Rate [  86





Right Apical]  


 


Respiratory 19 18





Rate  


 


Blood Pressure 158/86 


 


Blood Pressure  131/75





[Right Arm]  


 


O2 Sat by Pulse 98 98





Oximetry (%)  











GENERAL: Awake, alert, and fully oriented, in no acute distress.


HEAD: Normal with no signs of trauma.


EYES: Pupils equal, round and reactive to light, extraocular movements intact, 

sclera anicteric, conjunctiva clear. No lid lag.


EARS, NOSE, THROAT: Ears normal, nares patent, oropharynx clear without 

exudates. Dry mucous membranes.


NECK: Normal range of motion, supple without lymphadenopathy, JVD, or masses.


LUNGS: Breath sounds equal, clear to auscultation bilaterally. No wheezes, and 

no crackles. No accessory muscle use.


HEART: Irregular rate and rhythm, normal S1 and S2 without murmur, rub or 

gallop.


ABDOMEN: Soft, nontender, not distended, normoactive bowel sounds, no guarding, 

no rebound, no masses.  No hepatomegaly or  splenomegaly. 


MUSCULOSKELETAL: Limited range of motion at all joints.+Lower Lumbar tenderness 

to palpation. No bony deformities. No CVA tenderness.


UPPER EXTREMITIES: 2+ pulses, warm, well-perfused. No cyanosis. No clubbing. No 

peripheral edema.


LOWER EXTREMITIES: 2+ pulses, warm, well-perfused. No calf tenderness. No 

peripheral edema. 


NEUROLOGICAL:  Cranial nerves II-XII intact. Normal speech. Gait not observed.


PSYCHIATRIC: Cooperative. Good eye contact. Appropriate mood and affect.


SKIN: Warm, dry, normal turgor, no rashes or lesions noted, normal capillary 

refill. 





 Laboratory Results - last 24 hr











  01/19/18 01/19/18





  22:39 22:39


 


WBC   8.2


 


RBC   4.39


 


Hgb   13.2


 


Hct   39.3


 


MCV   89.6


 


MCH   30.1


 


MCHC   33.6


 


RDW   13.4


 


Plt Count   329


 


MPV   7.3 L


 


Neutrophils %   69.5


 


Lymphocytes %   17.5


 


Monocytes %   9.9


 


Eosinophils %   2.6


 


Basophils %   0.5


 


Sodium  132 L 


 


Potassium  4.1 


 


Chloride  96 L 


 


Carbon Dioxide  26 


 


Anion Gap  10 


 


BUN  19 H 


 


Creatinine  0.6 


 


Creat Clearance w eGFR  > 60 


 


Random Glucose  97 


 


Calcium  8.5 


 








Total Bilirubin  0.9 


 


AST  27 


 


ALT  17 


 


Alkaline Phosphatase  107 


 


Total Protein  6.8 


 


Albumin  3.2 L 











ASSESSMENT/PLAN:


This is a 87 y/o woman with a PMHx of Chronic Back Pain, Chromic Compression 

Fractures, Thyroid, HLD. Placed in Observation for Intractable Back Pain.








Plan:





Musculoskeletal


Intractable Back Pain


- hx Chronic Compression fx


- Bedrest


- Pain Meds prn


- Appreciate Case Management- for SNF/STR placement


- Appreciate Pain Mgmt Consult


- Monitor vitals


- CBC, BMP in am





F/E/N


- PO Fluids as tolerated


- Replete lytes prn


- Low Na Diet





DVT Prophylaxis


- OOB


- SCDs


- Heparin SQ





 Code Status: Full Code





Dispo: Observation





Problem List





- Problem


(1) Intractable back pain


Code(s): M54.9 - DORSALGIA, UNSPECIFIED   





(2) Compression fracture of L4 lumbar vertebra


Code(s): S32.040A - WEDGE COMPRESSION FRACTURE OF FOURTH LUMBAR VERTEBRA, INIT 

  





(3) GERD (gastroesophageal reflux disease)


Code(s): K21.9 - GASTRO-ESOPHAGEAL REFLUX DISEASE WITHOUT ESOPHAGITIS   





(4) HTN (hypertension)


Code(s): I10 - ESSENTIAL (PRIMARY) HYPERTENSION   





(5) DVT prophylaxis


Code(s): XDY0010 -    





Visit type





- Emergency Visit


Emergency Visit: Yes


ED Registration Date: 01/19/18


Care time: The patient presented to the Emergency Department on the above date 

and was hospitalized for further evaluation of their emergent condition.





- New Patient


This patient is new to me today: Yes


Date on this admission: 01/20/18





- Critical Care


Critical Care patient: No

## 2018-01-21 RX ADMIN — MORPHINE SULFATE PRN MG: 10 INJECTION, SOLUTION INTRAMUSCULAR; INTRAVENOUS at 03:27

## 2018-01-21 RX ADMIN — Medication SCH TAB: at 09:34

## 2018-01-21 RX ADMIN — ASPIRIN 81 MG SCH MG: 81 TABLET ORAL at 09:34

## 2018-01-21 RX ADMIN — MORPHINE SULFATE PRN MG: 10 INJECTION, SOLUTION INTRAMUSCULAR; INTRAVENOUS at 21:22

## 2018-01-21 RX ADMIN — MORPHINE SULFATE PRN MG: 10 INJECTION, SOLUTION INTRAMUSCULAR; INTRAVENOUS at 14:03

## 2018-01-21 RX ADMIN — GABAPENTIN SCH MG: 100 CAPSULE ORAL at 21:21

## 2018-01-21 RX ADMIN — CALCITONIN SALMON SCH UNITS: 200 SPRAY, METERED NASAL at 09:34

## 2018-01-21 RX ADMIN — GABAPENTIN SCH MG: 100 CAPSULE ORAL at 09:34

## 2018-01-21 RX ADMIN — LIDOCAINE SCH PATCH: 50 PATCH TOPICAL at 09:34

## 2018-01-21 RX ADMIN — Medication SCH: at 22:04

## 2018-01-21 NOTE — EKG
Test Reason : 

Blood Pressure : ***/*** mmHG

Vent. Rate : 078 BPM     Atrial Rate : 078 BPM

   P-R Int : 164 ms          QRS Dur : 066 ms

    QT Int : 396 ms       P-R-T Axes : 046 003 048 degrees

   QTc Int : 451 ms

 

NORMAL SINUS RHYTHM WITH SINUS ARRHYTHMIA

LOW VOLTAGE QRS

BORDERLINE ECG

WHEN COMPARED WITH ECG OF 20-JAN-2018 19:26,

PREMATURE SUPRAVENTRICULAR COMPLEXES ARE NO LONGER PRESENT

Confirmed by Gianfranco Jasso ([id number]) on 1/21/2018 4:05:19 PM

 

Referred By:             Confirmed By:Gianfranco Jasso

## 2018-01-21 NOTE — PN
Progress Note (short form)





- Note


Progress Note: 





 Neurology








History of Present Illness


The patient is an 88 year old female with past medical history of thyroid 

disease, hyperlipidemia, and spine injury (s/p laminectomy in 70s) who presents 

to the ED with complaints of lower back pain which has improved but main 

complaint was neuropathy in distal lower extremities. The patient rates the 

pain 10/10 and states it is a sharp pain that spans across her entire lower 

back and is making it difficult to position herself or walk. She states it does 

not radiate anywhere. She reports laying in bed when the pain began as she went 

to turn from her side to her back, and having to call her neighbor for help. 

She reports taking oxycodone for her pain which she states she doesnt normally 

take because it upsets her stomach. MRI L spine complete and reviewed, T11 

compression fx noted. Dr. Hutchison, NSGY, consulted and note reviewed. 

Recommended TLSO brace. Patient feeling slightly better and in good spirits. 





Active Medications





Aspirin (Asa -)  81 mg PO DAILY [facility]


   Last Admin: 01/21/18 09:34 Dose:  81 mg


Calcitonin (Miacalcin Spray -)  200 units NS DAILY [facility]


   Last Admin: 01/21/18 09:34 Dose:  200 units


Calcium Carbonate/Cholecalciferol (Os-Vito 500+D -)  1 tab PO DAILY [facility]


   Last Admin: 01/21/18 09:34 Dose:  1 tab


Gabapentin (Neurontin -)  100 mg PO BID [facility]


   Last Admin: 01/21/18 09:34 Dose:  100 mg


Lidocaine (Lidoderm Patch -)  1 patch TP DAILY [facility]


   Last Admin: 01/21/18 09:34 Dose:  1 patch


Miscellaneous (Lidoderm Patch Removal)  1 each MC DAILY@2200 [facility]


   Last Admin: 01/20/18 22:11 Dose:  1 each


Morphine Sulfate (Morphine Injection -)  2 mg IVPUSH Q6H-IV PRN


   PRN Reason: PAIN LEVEL 4 - 6


   Last Admin: 01/21/18 03:27 Dose:  2 mg


Ondansetron HCl (Zofran Injection)  4 mg IVPUSH Q6H PRN


   PRN Reason: NAUSEA AND/OR VOMITING


Polyethylene Glycol (Miralax (For Daily Use) -)  17 gm PO DAILY PRN


   PRN Reason: CONSTIPATION


Propranolol HCl (Inderal -)  20 mg PO TID [facility]


   Last Admin: 01/21/18 07:33 Dose:  20 mg


Zolpidem Tartrate (Ambien -)  5 mg PO HS PRN


   PRN Reason: INSOMNIA











*Physical Exam


 


 Vital Signs











Temperature  98.1 F   01/21/18 10:00


 


Pulse Rate  80   01/21/18 10:00


 


Respiratory Rate  22   01/21/18 10:00


 


Blood Pressure  99/53   01/21/18 10:00


 


O2 Sat by Pulse Oximetry (%)  96   01/21/18 06:00














General Appearance:  no acute distress, well nourished well developed,


Neck:  Supple;No Nuchal rigidity


Cardiac: Regular rate and rhythm, no murmurs, no rubs, no gallops,


Lungs: Clear to auscultation bilateral, good air movement bilaterally,


Abdomen:  Soft, nondistended, normal bowel sounds, nontender to palpation


Extremities: Full range of motion to all extremities, no cyanosis, clubbing, or 

edema


Back: Lumbar spine tenderness to palpation 


Skin:  Warm and dry, no rashes or lesions, no petechiae


Neuro: AOX3; Cranial Nerves 2-12 grossly intact, Strength intact to all 

extremities, Sensation intact to all extremities


Psych:  normal mood, normal affect





 


 CBCD











WBC  7.1 K/mm3 (4.0-10.0)   01/20/18  06:10    


 


RBC  4.35 M/mm3 (3.60-5.2)   01/20/18  06:10    


 


Hgb  12.9 GM/dL (10.7-15.3)   01/20/18  06:10    


 


Hct  38.8 % (32.4-45.2)   01/20/18  06:10    


 


MCV  89.2 fl (80-96)   01/20/18  06:10    


 


MCHC  33.3 g/dl (32.0-36.0)   01/20/18  06:10    


 


RDW  13.3 % (11.6-15.6)   01/20/18  06:10    


 


Plt Count  323 K/MM3 (134-434)   01/20/18  06:10    


 


MPV  7.2 fl (7.5-11.1)  L  01/20/18  06:10    








 CMP











Sodium  136 mmol/L (136-145)   01/20/18  06:10    


 


Potassium  3.9 mmol/L (3.5-5.1)   01/20/18  06:10    


 


Chloride  101 mmol/L ()   01/20/18  06:10    


 


Carbon Dioxide  22 mmol/L (21-32)   01/20/18  06:10    


 


Anion Gap  13  (8-16)   01/20/18  06:10    


 


BUN  16 mg/dL (7-18)   01/20/18  06:10    


 


Creatinine  0.5 mg/dL (0.55-1.02)  L  01/20/18  06:10    


 


Creat Clearance w eGFR  > 60  (>60)   01/19/18  22:39    


 


Calcium  7.8 mg/dL (8.5-10.1)  L  01/20/18  06:10    


 


Total Bilirubin  0.9 mg/dL (0.2-1.0)   01/19/18  22:39    


 


AST  27 U/L (15-37)   01/19/18  22:39    


 


ALT  17 U/L (12-78)   01/19/18  22:39    


 


Alkaline Phosphatase  107 U/L ()   01/19/18  22:39    


 


Total Protein  6.8 g/dl (6.4-8.2)   01/19/18  22:39    


 


Albumin  3.2 g/dl (3.4-5.0)  L  01/19/18  22:39    








MRI L spine reviewed





Plan:


88 year old female with past medical history of thyroid disease, hyperlipidemia

, and spine injury (s/p laminectomy in 70s) who presents to the ED with 

complaints of lower back pain which has improved but main complaint was 

neuropathy in distal lower extremities. The patient rates the pain 10/10 and 

states it is a sharp pain that spans across her entire lower back and is making 

it difficult to position herself or walk. She states it does not radiate 

anywhere. She reports laying in bed when the pain began as she went to turn 

from her side to her back, and having to call her neighbor for help. She 

reports taking oxycodone for her pain which she states she doesnt normally take 

because it upsets her stomach. The patient was seen in the ED two days ago for 

a similar episode of pain in which she was given naproxen. She states it helped 

mask the pain, but it did not go away at all. MRI L spine noted, NSGY note 

apperciated. Recommended TLSO brace. Continue pain mgmt. May benefit from short 

term rehab if patient amenable. Fall precautions needed. Monitor ambulation. 

Physical therapy.

## 2018-01-21 NOTE — EKG
Test Reason : 

Blood Pressure : ***/*** mmHG

Vent. Rate : 103 BPM     Atrial Rate : 103 BPM

   P-R Int : 154 ms          QRS Dur : 066 ms

    QT Int : 358 ms       P-R-T Axes : 040 009 044 degrees

   QTc Int : 468 ms

 

 

SINUS TACHYCARDIA WITH PREMATURE ATRIAL COMPLEXES

LOW VOLTAGE QRS

CANNOT RULE OUT ANTERIOR INFARCT (CITED ON OR BEFORE 20-JAN-2018)

ABNORMAL ECG

WHEN COMPARED WITH ECG OF 22-APR-2017 21:42,

PREMATURE ATRIAL COMPLEXES ARE NOW PRESENT

VENT. RATE HAS INCREASED BY  40 BPM

T WAVE INVERSION NOW EVIDENT IN ANTERIOR LEADS

Confirmed by Gianfranco Jasso ([id number]) on 1/21/2018 4:06:08 PM

 

Referred By:             Confirmed By:Gianfranco Jasso

## 2018-01-21 NOTE — PN
Progress Note, Physician


Chief Complaint: 





Intractable back pain


History of Present Illness: 





Pain management


seen by Neurology


started on Gabapentin





- Current Medication List


Current Medications: 


Active Medications





Aspirin (Asa -)  81 mg PO DAILY [facility]


   Last Admin: 01/21/18 09:34 Dose:  81 mg


Calcitonin (Miacalcin Spray -)  200 units NS DAILY [facility]


   Last Admin: 01/21/18 09:34 Dose:  200 units


Calcium Carbonate/Cholecalciferol (Os-Vito 500+D -)  1 tab PO DAILY [facility]


   Last Admin: 01/21/18 09:34 Dose:  1 tab


Gabapentin (Neurontin -)  100 mg PO BID [facility]


   Last Admin: 01/21/18 09:34 Dose:  100 mg


Lidocaine (Lidoderm Patch -)  1 patch TP DAILY [facility]


   Last Admin: 01/21/18 09:34 Dose:  1 patch


Miscellaneous (Lidoderm Patch Removal)  1 each MC DAILY@2200 [facility]


   Last Admin: 01/20/18 22:11 Dose:  1 each


Morphine Sulfate (Morphine Injection -)  2 mg IVPUSH Q6H-IV PRN


   PRN Reason: PAIN LEVEL 4 - 6


   Last Admin: 01/21/18 14:03 Dose:  2 mg


Ondansetron HCl (Zofran Injection)  4 mg IVPUSH Q6H PRN


   PRN Reason: NAUSEA AND/OR VOMITING


Polyethylene Glycol (Miralax (For Daily Use) -)  17 gm PO DAILY PRN


   PRN Reason: CONSTIPATION


Propranolol HCl (Inderal -)  20 mg PO TID [facility]


   Last Admin: 01/21/18 14:04 Dose:  20 mg


Zolpidem Tartrate (Ambien -)  5 mg PO HS PRN


   PRN Reason: INSOMNIA











- Objective


Vital Signs: 


 Vital Signs











Temperature  98.8 F   01/21/18 17:00


 


Pulse Rate  100 H  01/21/18 17:00


 


Respiratory Rate  18   01/21/18 17:00


 


Blood Pressure  104/63   01/21/18 17:00


 


O2 Sat by Pulse Oximetry (%)  97   01/21/18 09:00











Constitutional: Yes: Well Nourished, No Distress, Calm


Cardiovascular: Yes: Pulse Irregular (sinus arrhythmia)


Respiratory: Yes: Regular


Gastrointestinal: Yes: Normal Bowel Sounds, Soft


Musculoskeletal: Yes: Back Pain


Extremities: Yes: WNL


Edema: No


Peripheral Pulses WNL: Yes


Neurological: Yes: Alert, Oriented


Psychiatric: Yes: Alert, Oriented


Labs: 


 CBC, BMP





 01/20/18 06:10 





 01/20/18 06:10 











Problem List





- Problems


(1) Intractable low back pain


Assessment/Plan: 


-Pain management


-Neurology consult appreciated


-rehab- refuses at the moment saying she has tried it in the past, it didn't 

help


-symptoms improved with gabapentin, increased dose


-PT


-TLSO brace ordered


Code(s): M54.5 - LOW BACK PAIN   





(2) Compression fracture of L4 lumbar vertebra


Assessment/Plan: 


MRI lumbar spine reviewed


-see by Neurology


-Physical therapy


-gabapentin


Code(s): S32.040A - WEDGE COMPRESSION FRACTURE OF FOURTH LUMBAR VERTEBRA, INIT 

  





(3) Sinus arrhythmia


Assessment/Plan: 


-seen by cardiology


-tele monitoring


-no other cardiac intervention recommended


Code(s): I49.9 - CARDIAC ARRHYTHMIA, UNSPECIFIED   





(4) Sinus arrhythmia seen on electrocardiogram


Code(s): I49.8 - OTHER SPECIFIED CARDIAC ARRHYTHMIAS   





Assessment/Plan





see problem list

## 2018-01-21 NOTE — CON.CARD
Consult


Consult Specialty:: Cardiology


Reason for Consultation:: ECG





- History of Present Illness


History of Present Illness: 


88 year old female who presents with low back pain including Lumbar 

decompressive surgery in the 1970s. She had been doing reasonably well until 

several weeks ago and she has noted progressive pain in the midback and lumbar 

region with some paresthesias into her legs. She presented to the ER on Tuesday 

and was treated and released after initial evaluation. Her pain increased and 

progressed, and she returned to the ER unable to ambulate or change positions 

due to acute and progressive mechanical back pain.





There is a remote history of palpitations treated with inderal. Never diagnosed 

with an arrhythmia from what she can recall and never advised to take 

anticoagulants or consider ablation.








- History Source


History Provided By: Patient, Medical Record


Limitations to Obtaining History: No Limitations





- Past Medical History


Cardio/Vascular: Yes: HTN


Gastrointestinal: Yes: GERD


Musculoskeletal: Yes: Chronic low back pain





- Alcohol/Substance Use


Hx Alcohol Use: No





- Smoking History


Smoking history: Never smoked


Have you smoked in the past 12 months: No


Aproximately how many cigarettes per day: 0





Home Medications





- Allergies


Allergies/Adverse Reactions: 


 Allergies











Allergy/AdvReac Type Severity Reaction Status Date / Time


 


Sulfa (Sulfonamide Allergy   Verified 01/19/18 21:40





Antibiotics)     


 


acetaminophen [From Tylenol] AdvReac   Verified 01/19/18 21:40


 


oxycodone HCl AdvReac  Vomiting Verified 01/19/18 21:40





[From Roxicodone]     














- Home Medications


Home Medications: 


Ambulatory Orders





Aspirin [ASA -] 81 mg PO DAILY 12/14/14 


Polyethylene Glycol 3350 [Miralax 119 gm Btl -] 17 gm PO DAILY PRN 30 Days  

bottle 01/23/15 


Calcium 500Mg/Vit-D 200 Units [Os-Vito 500+D -] 1 combo PO DAILY #0 tablet 04/14/

15 


Zolpidem Tartrate [Ambien] 10 mg PO HS PRN #0 tablet 04/14/15 


Gabapentin 0 mg PO BID 04/22/17 


Propranolol HCl [Inderal -] 20 mg PO TID 04/22/17 


Alendronate Na [Fosamax] 70 mg PO Q7D 01/16/18 


Naproxen 250 mg PO BID #10 tablet 01/17/18 











Review of Systems





- Review of Systems


Constitutional: reports: No Symptoms


Eyes: reports: No Symptoms


HENT: reports: No Symptoms


Neck: reports: No Symptoms


Cardiovascular: reports: No Symptoms


Respiratory: reports: No Symptoms


Gastrointestinal: reports: No Symptoms


Vital Signs: 


 Vital Signs











Temperature  98.1 F   01/21/18 10:00


 


Pulse Rate  99 H  01/21/18 13:54


 


Respiratory Rate  24   01/21/18 13:54


 


Blood Pressure  110/64   01/21/18 13:54


 


O2 Sat by Pulse Oximetry (%)  96   01/21/18 06:00











Constitutional: Yes: Well Nourished, Thin


Eyes: Yes: Conjunctiva Clear, EOM Intact


HENT: Yes: Atraumatic, Normocephalic


Neck: Yes: Supple, Trachea Midline


Respiratory: Yes: Regular, CTA Bilaterally


Gastrointestinal: Yes: Normal Bowel Sounds, Soft


Cardiovascular: Yes: WNL, Regular Rate and Rhythm


JVD: No


Carotid Bruit: No


PMI: Non-Displaced


Heart Sounds: Yes: S1, S2


Murmur: No: Systolic Murmur, Diastolic Murmur


Extremities: Yes: WNL


Edema: No





- Other Data


Labs, Other Data: 


 CBC, BMP





 01/20/18 06:10 





 01/20/18 06:10 











NSR low voltage No STT changes.





Problem List





- Problems


(1) Back pain


Code(s): M54.9 - DORSALGIA, UNSPECIFIED   


Qualifiers: 


   Back pain location: low back pain   Chronicity: chronic   Back pain 

laterality: bilateral   Sciatica presence: without sciatica   Qualified Code(s)

: M54.5 - Low back pain; G89.29 - Other chronic pain; G89.29 - Other chronic 

pain   





(2) HTN (hypertension)


Code(s): I10 - ESSENTIAL (PRIMARY) HYPERTENSION   





Assessment/Plan





88 F with lumbar pain. She has a history of palpitations treated with inderal 

for many years. Her ECG is normal and she has no significant findings (

occasional APCs) on telemetry,


Follow holter results.


Will see as needed

## 2018-01-22 RX ADMIN — LIDOCAINE SCH PATCH: 50 PATCH TOPICAL at 09:33

## 2018-01-22 RX ADMIN — CALCITONIN SALMON SCH UNITS: 200 SPRAY, METERED NASAL at 09:33

## 2018-01-22 RX ADMIN — GABAPENTIN SCH MG: 100 CAPSULE ORAL at 22:29

## 2018-01-22 RX ADMIN — GABAPENTIN SCH MG: 100 CAPSULE ORAL at 05:27

## 2018-01-22 RX ADMIN — MORPHINE SULFATE PRN MG: 10 INJECTION, SOLUTION INTRAMUSCULAR; INTRAVENOUS at 09:33

## 2018-01-22 RX ADMIN — MORPHINE SULFATE PRN MG: 10 INJECTION, SOLUTION INTRAMUSCULAR; INTRAVENOUS at 22:36

## 2018-01-22 RX ADMIN — Medication SCH EACH: at 22:28

## 2018-01-22 RX ADMIN — Medication SCH TAB: at 09:32

## 2018-01-22 RX ADMIN — ASPIRIN 81 MG SCH MG: 81 TABLET ORAL at 09:32

## 2018-01-22 RX ADMIN — GABAPENTIN SCH MG: 100 CAPSULE ORAL at 13:51

## 2018-01-22 NOTE — PN
Progress Note, Physician


History of Present Illness: 





back pain





- Current Medication List


Current Medications: 


Active Medications





Aspirin (Asa -)  81 mg PO DAILY [facility]


   Last Admin: 01/21/18 09:34 Dose:  81 mg


Calcitonin (Miacalcin Spray -)  200 units NS DAILY [facility]


   Last Admin: 01/21/18 09:34 Dose:  200 units


Calcium Carbonate/Cholecalciferol (Os-Vito 500+D -)  1 tab PO DAILY [facility]


   Last Admin: 01/21/18 09:34 Dose:  1 tab


Gabapentin (Neurontin -)  100 mg PO TID [facility]


   Last Admin: 01/22/18 05:27 Dose:  100 mg


Lidocaine (Lidoderm Patch -)  1 patch TP DAILY [facility]


   Last Admin: 01/21/18 09:34 Dose:  1 patch


Miscellaneous (Lidoderm Patch Removal)  1 each MC DAILY@2200 [facility]


   Last Admin: 01/21/18 22:04 Dose:  Not Given


Morphine Sulfate (Morphine Injection -)  2 mg IVPUSH Q6H-IV PRN


   PRN Reason: PAIN LEVEL 4 - 6


   Last Admin: 01/21/18 21:22 Dose:  2 mg


Ondansetron HCl (Zofran Injection)  4 mg IVPUSH Q6H PRN


   PRN Reason: NAUSEA AND/OR VOMITING


Polyethylene Glycol (Miralax (For Daily Use) -)  17 gm PO DAILY PRN


   PRN Reason: CONSTIPATION


Propranolol HCl (Inderal -)  20 mg PO TID [facility]


   Last Admin: 01/22/18 05:26 Dose:  20 mg


Zolpidem Tartrate (Ambien -)  5 mg PO HS PRN


   PRN Reason: INSOMNIA











- Objective


Vital Signs: 


 Vital Signs











Temperature  98.1 F   01/22/18 05:29


 


Pulse Rate  77   01/22/18 05:29


 


Respiratory Rate  18   01/22/18 05:29


 


Blood Pressure  112/69   01/22/18 05:29


 


O2 Sat by Pulse Oximetry (%)  97   01/21/18 20:17











Cardiovascular: Yes: S1, S2


Respiratory: Yes: Regular, CTA Bilaterally


Gastrointestinal: Yes: Normal Bowel Sounds, Soft


Edema: No


Labs: 


 CBC, BMP





 01/20/18 06:10 





 01/20/18 06:10 











Assessment/Plan





- Problems


(1) Intractable low back pain


Assessment/Plan: 


-Pain management


-Neurology consult appreciated


-rehab- refuses at the moment saying she has tried it in the past, it didn't 

help


-symptoms improved with gabapentin, increased dose


-PT


-TLSO brace ordered


Code(s): M54.5 - LOW BACK PAIN   





(2) Compression fracture of L4 lumbar vertebra


Assessment/Plan: 


MRI lumbar spine reviewed


-see by Neurology


-Physical therapy


-gabapentin


Code(s): S32.040A - WEDGE COMPRESSION FRACTURE OF FOURTH LUMBAR VERTEBRA, INIT 

  





(3) Sinus arrhythmia


Assessment/Plan: 


-seen by cardiology


-tele monitoring


-no other cardiac intervention recommended


Code(s): I49.9 - CARDIAC ARRHYTHMIA, UNSPECIFIED

## 2018-01-22 NOTE — CONS
DATE OF CONSULTATION:  01/22/2018

 

REFERRING PHYSICIAN:  Sourav Lindquist MD

 

HISTORY OF PRESENT ILLNESS:  The patient is an 88-year-old woman with past medical

history of a spine injury dating back to the 1970s status post laminectomy, who

presents with chief complaint of back pain.  Patient has chronic problems with her

back and states that her pain worsened in December of 2017 and has progressed to the

point where she was unable to get out of bed and ambulate.  MRI of the lumbar spine

demonstrated a T11 compression fracture, apparently acute.  She was seen by

Neurology, who recommended a TLSO back brace.  The patient remains immobile in bed. 

There is a walker at the bedside but she has difficulty even getting onto a bedpan. 

She did have a complaint of left heel pain and some tingling in the distal lower

extremities at one point but none currently.  Blood work on admission showed WBC 8.2,

hemoglobin 13.2, platelet count normal at 329.  Repeat on January 20 was stable. 

Chemistry was within normal limits except for a low sodium on admission 132, which on

repeat January 20 had normalized to 136.  As of January 20, potassium 3.9, chloride

102, CO2 22, BUN 16, creatinine 0.5.  Patient again is seen in rehabilitation

evaluation.  Her main complaint is back pain across the lower back with difficulty

with her mobility.

 

Review of past medical and surgical history otherwise significant for thyroid

disease, hyperlipidemia, chronic back pain, status post laminectomy in the 1970s.

 

SOCIAL HISTORY:  Lives alone in a senior citizen apartment.  She states she has a lot

of help at home.  Premorbidly was ambulatory.  Recently using a walker.  She does not

smoke tobacco or drink alcohol.

 

Allergies to SULFA, ACETAMINOPHEN.

 

REVIEW OF SYSTEMS:  No lightheadedness, dizziness.  No headache.  No blurry vision or

double vision.  No nausea, vomiting, difficulty swallowing, difficulty chewing.  No

chest pain, shortness of breath, dyspnea on exertion, cough or abdominal discomfort. 

No bowel or bladder incontinence or retention.  No fever or chills.  She does have

some constipation and is getting MiraLax but no bowel movement in a few days.  Again,

she did have some tingling in the lower extremities, left heel pain, but currently

only complains of back pain and difficulty with her mobility.  No skin rash noted. 

No fever or chills.

 

EXAMINATION:

General:  A thin elderly woman who is awake, alert, lying supine in bed, and is in no

acute distress.

HEENT:  She is normocephalic, atraumatic.  Her extraocular muscles appear full.  She

has no obvious facial weakness.

Neck:  Supple.

Extremities:  Without any pitting edema or calf tenderness.

Skin:  Without any rash.  Breakdown in the heel, either the heel that is bothering

her or the other heel.  No diffuse rash.

Neuromuscular:  She is awake, alert, seems to be a good historian and is oriented x3.

 Cranial nerves 2-12 are grossly intact.  She has good strength and range throughout

her upper limbs with normal sensation to pinprick and symmetric reflexes in the lower

extremities.  She has good distal strength, dorsiflexion and plantar flexion, knee

flexion and extension.  Mild hip girdle weakness due to causing increased back pain. 

Symmetric reflexes.  Normal sensation to light touch and pinprick in the lower

extremities.  She has tenderness across the lower lumbar/upper sacrum area to

palpation.  Unable to stand or ambulate her due to pain at this time.  She has

difficulty turning side to side.

 

OVERALL IMPRESSION:

1.  Deficits in mobility, activities of daily living.

2.  Low back pain with evidence on MRI of an acute T11 compression fracture in

addition to more chronic compression fractures and underlying degenerative changes.

3.  Constipation.

4.  Status post pins and needles in the lower extremities.  Rule out neuropathy

versus paresthesias from nerve root compression.

5.  History of thyroid disease.

6.  Hyperlipidemia.

 

PLAN, SUGGESTION:

1.  Physical therapy at the bedside to include bed mobility, transfers, gait

training, strengthening, reconditioning.

2.  Pain management, currently getting a patch.  Apparently not a candidate for

anything with Tylenol.  She is already on Neurontin.  Would monitor mental status.

3.  Out of bed to chair when able.

4.  Agree with TLSO such as a lumbosacral corset with a molded back piece for

comfort.

5.  Bowel regimen.  She is on MiraLax.  May need milk of magnesia or some supplement.

6.  Discussed with patient possible need for short-term rehab.  She would prefer not

to go to short-term rehab in a skilled nursing facility but knows this is possible. 

She is unable to ambulate.

 

Thank you for this consultation.

 

 

LINNEA WEBER M.D.

 

CELESTE/[id number]

DD: 01/22/2018 11:22

DT: 01/22/2018 13:25

Job #:  [id number]

## 2018-01-22 NOTE — HOL
Hook-up date: 2018-01-21   10:01:00       Duration: 23:55:00

Test Indications: ST WITH PAC

Medications: 

 

 

 

 

 

 

 

449650 QRS complexes

     3 Ventricular      ectopics which represent    <1 % of total QRS comp.

  2057 Supraventricular ectopics which represent     1 % of total QRS comp.

     * Paced QRS complexs        which represent  **** % of total QRS comp.

     * % of Time Classified as Noise

VENTRICULAR ECTOPY

     3 Isolated

     0 Bigeminal Cycles

     0 Couplets

     0 Runs

     0 Beats in Runs

     * Beats LONGEST at   *  BPM at **:**:** ****-**-**

     * Beats FASTEST at   *  BPM at **:**:** ****-**-**

SUPRAVENTRICULAR ECTOPY

  1867 Isolated

    54 Couplets

    14 Runs

    81 Beats in Runs

    13 Beats LONGEST at 157  BPM at 16:05:40 2018-01-21

     3 Beats FASTEST at 180  BPM at 16:56:55 2018-01-21

HEART RATES

    70 MIN at 04:40:28 2018-01-22

    86 AVG

    97 MAX at 20:02:06 2018-01-21

LONGEST RR

     1.936 secs at 06:13:53 2018-01-22   

SCANNED BY DANITA NEWELL ON 1/22/18

1. BASELINE RHYTHM IS SINUS RHYTHM WITH AVERAGE HEART RATE OF 86 BEATS

PER MINUTE. RATES VARIED FROM 70 TO 97 BEATS PER MINUTE

2. RARE PREMATURE VENTRICULAR COMPLEXES

3. FREQUENT ATRIAL ECTOPIES INCLUDING 1867 PREMATURE ATRIAL COMPLEXES,

54 ATRIAL COUPLETS AND 14 RUNS OF NON-SUSTAINED  SUPRAVENTRICULAR

COMPLEXES LONGEST WAS 13 BEATS AT A RATE   BEATS PER

MINUTE

4. NO SIGNIFICANT ST OR T WAVE VARIATIONS

5. DIARY WAS NOT SUBMITTED

 

CLINICAL CORRELATION IS RECOMMENDED

 

Confirmed by CORNELIUS FLORES, PATTIE ([id number]) on 1/22/2018 2:31:02 PM

Referred By: TOYIN VIGIL DR           Overread By: PATTIE SHIELDS MD

## 2018-01-22 NOTE — PN
Progress Note (short form)





- Note


Progress Note: 





 Neurology








History of Present Illness


The patient is an 88 year old female with past medical history of thyroid 

disease, hyperlipidemia, and spine injury (s/p laminectomy in 70s) who presents 

to the ED with complaints of lower back pain which has improved but main 

complaint was neuropathy in distal lower extremities. The patient rates the 

pain 10/10 and states it is a sharp pain that spans across her entire lower 

back and is making it difficult to position herself or walk. She states it does 

not radiate anywhere. She reports laying in bed when the pain began as she went 

to turn from her side to her back, and having to call her neighbor for help. 

She reports taking oxycodone for her pain which she states she doesn't normally 

take because it upsets her stomach. MRI L spine complete and reviewed, T11 

compression fx noted. Dr. Hutchison, NSGY, consulted and note reviewed. 

Recommended TLSO brace. Spoke to nurse and she is looking into obtaining it for 

patient. Patient does not want to go to rehab and much prefers to go home. Has 

rolling walker and health aid. Would be safer at rehab, will defer to PCP 

regarding dispo. States her pain has improved and has been getting morphine.  





Active Medications





Aspirin (Asa -)  81 mg PO DAILY [facility]


   Last Admin: 01/22/18 09:32 Dose:  81 mg


Calcitonin (Miacalcin Spray -)  200 units NS DAILY [facility]


   Last Admin: 01/22/18 09:33 Dose:  200 units


Calcium Carbonate/Cholecalciferol (Os-Vito 500+D -)  1 tab PO DAILY [facility]


   Last Admin: 01/22/18 09:32 Dose:  1 tab


Gabapentin (Neurontin -)  100 mg PO TID [facility]


   Last Admin: 01/22/18 05:27 Dose:  100 mg


Lidocaine (Lidoderm Patch -)  1 patch TP DAILY [facility]


   Last Admin: 01/22/18 09:33 Dose:  1 patch


Miscellaneous (Lidoderm Patch Removal)  1 each MC DAILY@2200 [facility]


   Last Admin: 01/21/18 22:04 Dose:  Not Given


Morphine Sulfate (Morphine Injection -)  2 mg IVPUSH Q6H-IV PRN


   PRN Reason: PAIN LEVEL 4 - 6


   Last Admin: 01/22/18 09:33 Dose:  2 mg


Ondansetron HCl (Zofran Injection)  4 mg IVPUSH Q6H PRN


   PRN Reason: NAUSEA AND/OR VOMITING


Polyethylene Glycol (Miralax (For Daily Use) -)  17 gm PO DAILY PRN


   PRN Reason: CONSTIPATION


Propranolol HCl (Inderal -)  20 mg PO TID [facility]


   Last Admin: 01/22/18 05:26 Dose:  20 mg


Zolpidem Tartrate (Ambien -)  5 mg PO HS PRN


   PRN Reason: INSOMNIA














*Physical Exam


 


 


 Vital Signs











Temperature  98.1 F   01/22/18 05:29


 


Pulse Rate  77   01/22/18 05:29


 


Respiratory Rate  18   01/22/18 05:29


 


Blood Pressure  112/69   01/22/18 05:29


 


O2 Sat by Pulse Oximetry (%)  97   01/21/18 20:17














General Appearance:  no acute distress, well nourished well developed,


Neck:  Supple;No Nuchal rigidity


Cardiac: Regular rate and rhythm, no murmurs, no rubs, no gallops,


Lungs: Clear to auscultation bilateral, good air movement bilaterally,


Abdomen:  Soft, nondistended, normal bowel sounds, nontender to palpation


Extremities: Full range of motion to all extremities, no cyanosis, clubbing, or 

edema


Back: Lumbar spine tenderness to palpation 


Skin:  Warm and dry, no rashes or lesions, no petechiae


Neuro: AOX3; Cranial Nerves 2-12 grossly intact, Strength intact to all 

extremities, Sensation intact to all extremities


Psych:  normal mood, normal affect





 


 


 CBCD











WBC  7.1 K/mm3 (4.0-10.0)   01/20/18  06:10    


 


RBC  4.35 M/mm3 (3.60-5.2)   01/20/18  06:10    


 


Hgb  12.9 GM/dL (10.7-15.3)   01/20/18  06:10    


 


Hct  38.8 % (32.4-45.2)   01/20/18  06:10    


 


MCV  89.2 fl (80-96)   01/20/18  06:10    


 


MCHC  33.3 g/dl (32.0-36.0)   01/20/18  06:10    


 


RDW  13.3 % (11.6-15.6)   01/20/18  06:10    


 


Plt Count  323 K/MM3 (134-434)   01/20/18  06:10    


 


MPV  7.2 fl (7.5-11.1)  L  01/20/18  06:10    








 CMP











Sodium  136 mmol/L (136-145)   01/20/18  06:10    


 


Potassium  3.9 mmol/L (3.5-5.1)   01/20/18  06:10    


 


Chloride  101 mmol/L ()   01/20/18  06:10    


 


Carbon Dioxide  22 mmol/L (21-32)   01/20/18  06:10    


 


Anion Gap  13  (8-16)   01/20/18  06:10    


 


BUN  16 mg/dL (7-18)   01/20/18  06:10    


 


Creatinine  0.5 mg/dL (0.55-1.02)  L  01/20/18  06:10    


 


Creat Clearance w eGFR  > 60  (>60)   01/19/18  22:39    


 


Calcium  7.8 mg/dL (8.5-10.1)  L  01/20/18  06:10    


 


Total Bilirubin  0.9 mg/dL (0.2-1.0)   01/19/18  22:39    


 


AST  27 U/L (15-37)   01/19/18  22:39    


 


ALT  17 U/L (12-78)   01/19/18  22:39    


 


Alkaline Phosphatase  107 U/L ()   01/19/18  22:39    


 


Total Protein  6.8 g/dl (6.4-8.2)   01/19/18  22:39    


 


Albumin  3.2 g/dl (3.4-5.0)  L  01/19/18  22:39    











MRI L spine reviewed





Plan:


88 year old female with past medical history of thyroid disease, hyperlipidemia

, and spine injury (s/p laminectomy in 70s) who presents to the ED with 

complaints of lower back pain which has improved but main complaint was 

neuropathy in distal lower extremities. The patient rates the pain 10/10 and 

states it is a sharp pain that spans across her entire lower back and is making 

it difficult to position herself or walk. She states it does not radiate 

anywhere. She reports laying in bed when the pain began as she went to turn 

from her side to her back, and having to call her neighbor for help. She 

reports taking oxycodone for her pain which she states she doesnt normally take 

because it upsets her stomach. The patient was seen in the ED two days ago for 

a similar episode of pain in which she was given naproxen. She states it helped 

mask the pain, but it did not go away at all. MRI L spine noted, NSGY note 

apperciated. Recommended TLSO brace. Continue pain mgmt, added gabapentin for 

her. May benefit from short term rehab if patient amenable but reluctant at 

this time. Fall precautions needed. Monitor ambulation. Physical therapy.  

Assistive device if going home, has walker and home health aid.

## 2018-01-22 NOTE — PN
Progress Note (short form)





- Note


Progress Note: 





Patient was able to ambulate with TLSO and assistance. Still having bilateral 

lower extremity radicular pains. Understandably not interested in surgical 

intervention as a first line treatment. I feel that medication adjustment and 

bracing with possible injection treatment would be the next line of care (both 

Epidural Steroid Injection and percutaneous cement augmentation (kyphoplasty 

versus vertebroplasty) might be considered.





Will continue to monitor progress and assist with conservative management 

regimen.

## 2018-01-23 RX ADMIN — ZOLPIDEM TARTRATE PRN MG: 5 TABLET, COATED ORAL at 01:59

## 2018-01-23 RX ADMIN — ZOLPIDEM TARTRATE PRN MG: 5 TABLET, COATED ORAL at 21:07

## 2018-01-23 RX ADMIN — LIDOCAINE SCH PATCH: 50 PATCH TOPICAL at 10:33

## 2018-01-23 RX ADMIN — GABAPENTIN SCH MG: 100 CAPSULE ORAL at 21:07

## 2018-01-23 RX ADMIN — CALCITONIN SALMON SCH UNITS: 200 SPRAY, METERED NASAL at 11:21

## 2018-01-23 RX ADMIN — Medication SCH TAB: at 10:33

## 2018-01-23 RX ADMIN — MORPHINE SULFATE PRN MG: 10 INJECTION, SOLUTION INTRAMUSCULAR; INTRAVENOUS at 18:47

## 2018-01-23 RX ADMIN — ASPIRIN 81 MG SCH MG: 81 TABLET ORAL at 10:33

## 2018-01-23 RX ADMIN — GABAPENTIN SCH MG: 100 CAPSULE ORAL at 14:43

## 2018-01-23 RX ADMIN — GABAPENTIN SCH MG: 100 CAPSULE ORAL at 05:50

## 2018-01-23 RX ADMIN — MORPHINE SULFATE PRN MG: 10 INJECTION, SOLUTION INTRAMUSCULAR; INTRAVENOUS at 12:43

## 2018-01-23 RX ADMIN — Medication SCH EACH: at 21:08

## 2018-01-23 NOTE — PN
Progress Note, Physician


History of Present Illness: 





back pain





- Current Medication List


Current Medications: 


Active Medications





Aspirin (Asa -)  [facility]


   Last Admin: 01/22/18 09:32 Dose:  81 mg


Calcitonin (Miacalcin Spray -)  [facility] units NS [facility]


   Last Admin: 01/22/18 09:33 Dose:  200 units


Calcium Carbonate/Cholecalciferol (Os-Vito 500+D -)  1 tab [facility]ILY [facility]


   Last Admin: 01/22/18 09:32 Dose:  1 tab


Gabapentin (Neurontin -)  [facility] mg [facility] TID [facility]


   Last Admin: 01/23/18 05:50 Dose:  100 mg


Lidocaine (Lidoderm Patch -)  1 patch TP [facility]


   Last Admin: 01/22/18 09:33 Dose:  1 patch


Miscellaneous (Lidoderm Patch Removal)  1 each MC DAILY@2200 [facility]


   Last Admin: 01/22/18 22:28 Dose:  1 each


Morphine Sulfate (Morphine Injection -)  2 mg IVPUSH Q6H-IV PRN


   PRN Reason: PAIN LEVEL 4 - 6


   Last Admin: 01/22/18 22:36 Dose:  2 mg


Ondansetron HCl (Zofran Injection)  4 mg IVPUSH Q6H PRN


   PRN Reason: NAUSEA AND/OR VOMITING


Polyethylene Glycol (Miralax (For Daily Use) -)  17 gm PO DAILY PRN


   PRN Reason: CONSTIPATION


Propranolol HCl (Inderal -)  20 mg PO TID [facility]


   Last Admin: 01/23/18 05:50 Dose:  20 mg


Zolpidem Tartrate (Ambien -)  5 mg PO HS PRN


   PRN Reason: INSOMNIA


   Last Admin: 01/23/18 01:59 Dose:  5 mg











- Objective


Vital Signs: 


 Vital Signs











Temperature  98 F   01/23/18 10:00


 


Pulse Rate  90   01/23/18 10:00


 


Respiratory Rate  18   01/23/18 10:00


 


Blood Pressure  118/76   01/23/18 10:00


 


O2 Sat by Pulse Oximetry (%)  92 L  01/22/18 22:00











Cardiovascular: Yes: Regular Rate and Rhythm


Respiratory: Yes: Regular, CTA Bilaterally


Gastrointestinal: Yes: Normal Bowel Sounds, Soft


Labs: 


 CBC, BMP





 01/20/18 06:10 





 01/20/18 06:10 











Assessment/Plan





- Problems


(1) Intractable low back pain


Assessment/Plan: 


-Pain management


-Neurology consult appreciated


-rehab- refuses at the moment saying she has tried it in the past, it didn't 

help


-symptoms improved with gabapentin, increased dose


-PT


-TLSO brace ordered


Code(s): M54.5 - LOW BACK PAIN   





(2) Compression fracture of L4 lumbar vertebra


Assessment/Plan: 


MRI lumbar spine reviewed


-see by Neurology


-Physical therapy


-gabapentin


-IR EVAL


Code(s): S32.040A - WEDGE COMPRESSION FRACTURE OF FOURTH LUMBAR VERTEBRA, INIT 

  





(3) Sinus arrhythmia


Assessment/Plan: 


-seen by cardiology


-tele monitoring


-no other cardiac intervention recommended


Code(s): I49.9 - CARDIAC ARRHYTHMIA, UNSPECIFIED   








REFUSES SNF--PT THEN HOME WITH SERVICES

## 2018-01-23 NOTE — PN
Progress Note (short form)





- Note


Progress Note: 


Patient remains stable. No acute changes.

## 2018-01-24 RX ADMIN — LIDOCAINE SCH PATCH: 50 PATCH TOPICAL at 10:03

## 2018-01-24 RX ADMIN — GABAPENTIN SCH MG: 100 CAPSULE ORAL at 14:46

## 2018-01-24 RX ADMIN — Medication SCH EACH: at 21:48

## 2018-01-24 RX ADMIN — GABAPENTIN SCH MG: 100 CAPSULE ORAL at 21:49

## 2018-01-24 RX ADMIN — MORPHINE SULFATE PRN MG: 10 INJECTION, SOLUTION INTRAMUSCULAR; INTRAVENOUS at 10:17

## 2018-01-24 RX ADMIN — GABAPENTIN SCH MG: 100 CAPSULE ORAL at 06:50

## 2018-01-24 RX ADMIN — POLYETHYLENE GLYCOL 3350 PRN GRAMS: 17 POWDER, FOR SOLUTION ORAL at 10:26

## 2018-01-24 RX ADMIN — CALCITONIN SALMON SCH UNITS: 200 SPRAY, METERED NASAL at 14:46

## 2018-01-24 RX ADMIN — Medication SCH TAB: at 10:03

## 2018-01-24 NOTE — DS
Physical Examination


Vital Signs: 


 Vital Signs











Temperature  98 F   01/24/18 09:42


 


Pulse Rate  82   01/24/18 09:42


 


Respiratory Rate  20   01/24/18 09:42


 


Blood Pressure  120/72   01/24/18 09:42


 


O2 Sat by Pulse Oximetry (%)  95   01/23/18 21:00











Constitutional: Yes: Well Nourished, No Distress, Calm


Cardiovascular: Yes: Regular Rate and Rhythm


Respiratory: Yes: Regular


Gastrointestinal: Yes: Normal Bowel Sounds


Musculoskeletal: Yes: Back Pain


Extremities: Yes: WNL


Edema: No


Peripheral Pulses WNL: Yes


Neurological: Yes: Alert, Oriented


Psychiatric: Yes: Alert, Oriented


Labs: 


 CBC, BMP





 01/20/18 06:10 





 01/20/18 06:10 











Discharge Summary


Reason For Visit: BACK PAIN


Current Active Problems





Back pain (Acute)


DVT prophylaxis (Acute)


Intractable low back pain (Acute)


Sinus arrhythmia (Acute)


Sinus arrhythmia seen on electrocardiogram (Acute)








Hospital Course: 





This is a 89 y/o woman with a past medical history of Chronic Back Pain, 

Chronic Compression Fracture, Thyrid, HLD. Who presents to the ED with back 

pain. Patient reports severe lower back pain. Patient reports increase pain 

with movement. Patient states" I can't walk or sleep because of the pain, which 

is sharp across my lower back". Patient was seen in the ED 1/18 for same- d/cd 

with Naproxen, now without relief. Patient denies recent fall or trauma. 

Patient denies numbness to her lower extremities. Patient denies bowel or 

bladder incontinence. Patient denies fever, chills, cough, SOB, CP, palpitations

, N/V/D, constipation, dysuria


Condition: Stable





- Instructions


Disposition: VNS/HOME HEALTH CARE





- Home Medications


Comprehensive Discharge Medication List: 


Ambulatory Orders





Aspirin [ASA -] 81 mg PO DAILY 12/14/14 


Polyethylene Glycol 3350 [Miralax 119 gm Btl -] 17 gm PO DAILY PRN 30 Days  

bottle 01/23/15 


Calcium 500Mg/Vit-D 200 Units [Os-Vito 500+D -] 1 combo PO DAILY #0 tablet 04/14/

15 


Zolpidem Tartrate [Ambien] 10 mg PO HS PRN #0 tablet 04/14/15 


Gabapentin 0 mg PO BID 04/22/17 


Propranolol HCl [Inderal -] 20 mg PO TID 04/22/17 


Alendronate Na [Fosamax] 70 mg PO Q7D 01/16/18 


Naproxen 250 mg PO BID #10 tablet 01/17/18

## 2018-01-25 RX ADMIN — GABAPENTIN SCH MG: 300 CAPSULE ORAL at 10:10

## 2018-01-25 RX ADMIN — MORPHINE SULFATE PRN MG: 10 INJECTION, SOLUTION INTRAMUSCULAR; INTRAVENOUS at 20:43

## 2018-01-25 RX ADMIN — GABAPENTIN SCH MG: 300 CAPSULE ORAL at 13:47

## 2018-01-25 RX ADMIN — LIDOCAINE SCH PATCH: 50 PATCH TOPICAL at 10:10

## 2018-01-25 RX ADMIN — GABAPENTIN SCH: 300 CAPSULE ORAL at 22:21

## 2018-01-25 RX ADMIN — GABAPENTIN SCH MG: 300 CAPSULE ORAL at 19:52

## 2018-01-25 RX ADMIN — Medication SCH EACH: at 22:35

## 2018-01-25 RX ADMIN — Medication SCH TAB: at 10:10

## 2018-01-25 RX ADMIN — CALCITONIN SALMON SCH UNITS: 200 SPRAY, METERED NASAL at 10:10

## 2018-01-25 NOTE — PN
Progress Note, Physician


Chief Complaint: 





Intractable back pain


History of Present Illness: 





Pain management


seen by Neurology


started on Gabapentin





- Current Medication List


Current Medications: 


Active Medications





Acetaminophen (Tylenol -)  650 mg PO Q4H PRN


   PRN Reason: PAIN LEVEL 1 - 3


Calcitonin (Miacalcin [place] -)  200 units NS DAILY [facility]


   Last Admin: 01/25/18 10:10 Dose:  200 units


Calcium Carbonate/Cholecalciferol (Os-Vito 500+D -)  1 tab PO DAILY [facility]


   Last Admin: 01/25/18 10:10 Dose:  1 tab


Gabapentin (Neurontin -)  300 mg PO QID [facility]


   Last Admin: 01/25/18 10:10 Dose:  300 mg


Lidocaine (Lidoderm Patch -)  1 patch TP DAILY [facility]


   Last Admin: 01/25/18 10:10 Dose:  1 patch


Miscellaneous (Lidoderm Patch Removal)  1 each MC DAILY@2200 [facility]


   Last Admin: 01/24/18 21:48 Dose:  1 each


Morphine Sulfate (Morphine Injection -)  2 mg IVPUSH Q6H PRN


   PRN Reason: PAIN LEVEL 6-10


Ondansetron HCl (Zofran Injection)  4 mg IVPUSH Q6H PRN


   PRN Reason: NAUSEA AND/OR VOMITING


Polyethylene Glycol (Miralax (For Daily Use) -)  17 gm PO DAILY PRN


   PRN Reason: CONSTIPATION


   Last Admin: 01/24/18 10:26 Dose:  17 grams


Propranolol HCl (Inderal -)  20 mg PO TID [facility]


   Last Admin: 01/25/18 06:22 Dose:  20 mg


Tramadol HCl (Ultram -)  50 mg PO Q6H PRN


   PRN Reason: PAIN LEVEL 4 - 5


   Last Admin: 01/24/18 21:49 Dose:  50 mg











- Objective


Vital Signs: 


 Vital Signs











Temperature  97.7 F   01/25/18 09:52


 


Pulse Rate  88   01/25/18 09:52


 


Respiratory Rate  18   01/25/18 09:52


 


Blood Pressure  118/69   01/25/18 09:52


 


O2 Sat by Pulse Oximetry (%)  93 L  01/24/18 21:00











Constitutional: Yes: Well Nourished, No Distress, Calm


Cardiovascular: Yes: Regular Rate and Rhythm


Respiratory: Yes: Regular


Gastrointestinal: Yes: Normal Bowel Sounds, Soft


Musculoskeletal: Yes: Back Pain, Muscle Weakness


Extremities: Yes: WNL


Edema: No


Peripheral Pulses WNL: Yes


Neurological: Yes: Alert, Oriented


Psychiatric: Yes: Alert, Oriented


Labs: 


 CBC, BMP





 01/20/18 06:10 





 01/20/18 06:10 











Problem List





- Problems


(1) Intractable low back pain


Assessment/Plan: 


-Pain management


-Neurology consult appreciated


-wants to go home


-symptoms improved with gabapentin, increased dose


-PT


-TLSO brace ordered,


Code(s): M54.5 - LOW BACK PAIN   





(2) Compression fracture of L4 lumbar vertebra


Assessment/Plan: 


MRI lumbar spine reviewed


-see by Neurology


-Physical therapy


-gabapentin


Code(s): S32.040A - WEDGE COMPRESSION FRACTURE OF FOURTH LUMBAR VERTEBRA, INIT 

  





(3) Sinus arrhythmia


Assessment/Plan: 


-seen by cardiology


-no other cardiac intervention recommended


Code(s): I49.9 - CARDIAC ARRHYTHMIA, UNSPECIFIED   





(4) Sinus arrhythmia seen on electrocardiogram


Code(s): I49.8 - OTHER SPECIFIED CARDIAC ARRHYTHMIAS   





Assessment/Plan





see problem list

## 2018-01-25 NOTE — PN
Progress Note (short form)





- Note


Progress Note: 





Patient comfortable in bed. Ambulated with PT assist earlier today. Patient 

having difficulty with TLSO brace.





PLAN


-continue supportive care


-consider percutaneous cement augmentation (kyphoplasty versus vertebroplasty)


-consider use of abdominal binder/corset or simpler brace such as Aspen 

Quickdraw Contour

## 2018-01-26 RX ADMIN — LIDOCAINE SCH PATCH: 50 PATCH TOPICAL at 11:28

## 2018-01-26 RX ADMIN — GABAPENTIN SCH MG: 300 CAPSULE ORAL at 14:28

## 2018-01-26 RX ADMIN — MORPHINE SULFATE PRN MG: 10 INJECTION, SOLUTION INTRAMUSCULAR; INTRAVENOUS at 17:52

## 2018-01-26 RX ADMIN — MORPHINE SULFATE PRN MG: 10 INJECTION, SOLUTION INTRAMUSCULAR; INTRAVENOUS at 11:28

## 2018-01-26 RX ADMIN — GABAPENTIN SCH MG: 300 CAPSULE ORAL at 21:47

## 2018-01-26 RX ADMIN — Medication SCH EACH: at 21:47

## 2018-01-26 RX ADMIN — Medication SCH TAB: at 11:27

## 2018-01-26 RX ADMIN — CALCITONIN SALMON SCH UNITS: 200 SPRAY, METERED NASAL at 12:20

## 2018-01-26 RX ADMIN — GABAPENTIN SCH MG: 300 CAPSULE ORAL at 11:27

## 2018-01-26 RX ADMIN — GABAPENTIN SCH MG: 300 CAPSULE ORAL at 17:53

## 2018-01-26 NOTE — HOSP
Subjective





- Review of Symptoms


Events since last encounter: 





Notified by the primary RN that the patient is scheduled for discharge to the 

nursing home today, but cannot go secondary to the LSO back brace that was 

ordered since 12pm today is not available. As per the RN, the nursing 

supervisor of [facility] stated that the facility does not have an LSO brace 

available and cannot accept the patient without it. 








A/P


This is a 87 y/o woman with a PMHx of Chronic Back Pain, Chromic Compression 

Fractures, Thyroid, HLD. Admitted for Intractable Back Pain.





Discharge order cancelled 


LSO brace before d/c to SNF


RN to inform PCP in am of overnight events








Physical Examination


Vital Signs: 


 Vital Signs











Temperature  98.1 F   01/26/18 18:00


 


Pulse Rate  79   01/26/18 18:00


 


Respiratory Rate  20   01/26/18 18:00


 


Blood Pressure  112/70   01/26/18 18:00


 


O2 Sat by Pulse Oximetry (%)  94 L  01/25/18 21:00











Labs: 


 CBC, BMP





 01/20/18 06:10 





 01/20/18 06:10

## 2018-01-26 NOTE — PN
Progress Note (short form)





- Note


Progress Note: 





Patient resting comfortably with no complaints. Ambulates with assistance.


Agree with plans for Rehab and vertebroplasty once off [facility] for 5 days


Patient may use TLSO or abdominal binder if TLSO is too cumbersome

## 2018-01-26 NOTE — PN
Progress Note, Physician


Chief Complaint: 





patient was taking aspiring till 1/23 needs to be off it for 5 days to get 

verteboplasty


can be done on wednesday as outpatient


needs LSO brace when ambulating


explained her to go to snf


has back pain was made to stand up with support of two people





- Current Medication List


Current Medications: 


Active Medications





Acetaminophen (Tylenol -)  650 mg PO Q4H PRN


   PRN Reason: PAIN LEVEL 1 - 3


Calcitonin (Miacalcin [place] -)  200 units NS DAILY Counts include 234 beds at the [facility]


   Last Admin: 01/26/18 12:20 Dose:  200 units


Calcium Carbonate/Cholecalciferol (Os-Vito 500+D -)  1 tab PO DAILY Counts include 234 beds at the [facility]


   Last Admin: 01/26/18 11:27 Dose:  1 tab


Gabapentin (Neurontin -)  300 mg PO QID Counts include 234 beds at the [facility]


   Last Admin: 01/26/18 11:27 Dose:  300 mg


Lidocaine (Lidoderm Patch -)  1 patch TP DAILY Counts include 234 beds at the [facility]


   Last Admin: 01/26/18 11:28 Dose:  1 patch


Miscellaneous (Lidoderm Patch Removal)  1 each MC DAILY@2200 Counts include 234 beds at the [facility]


   Last Admin: 01/25/18 22:35 Dose:  1 each


Morphine Sulfate (Morphine Injection -)  2 mg IVPUSH Q6H PRN


   PRN Reason: PAIN LEVEL 6-10


   Last Admin: 01/26/18 11:28 Dose:  2 mg


Ondansetron HCl (Zofran Injection)  4 mg IVPUSH Q6H PRN


   PRN Reason: NAUSEA AND/OR VOMITING


Polyethylene Glycol (Miralax (For Daily Use) -)  17 gm PO DAILY PRN


   PRN Reason: CONSTIPATION


   Last Admin: 01/24/18 10:26 Dose:  17 grams


Propranolol HCl (Inderal -)  20 mg PO TID Counts include 234 beds at the [facility]


   Last Admin: 01/26/18 05:29 Dose:  20 mg


Tramadol HCl (Ultram -)  50 mg PO Q6H PRN


   PRN Reason: PAIN LEVEL 4 - 5


   Last Admin: 01/26/18 05:29 Dose:  50 mg











- Objective


Vital Signs: 


 Vital Signs











Temperature  98.5 F   01/26/18 11:21


 


Pulse Rate  78   01/26/18 11:21


 


Respiratory Rate  20   01/26/18 11:21


 


Blood Pressure  108/72   01/26/18 11:21


 


O2 Sat by Pulse Oximetry (%)  94 L  01/25/18 21:00











Constitutional: Yes: Calm


Neck: Yes: Trachea Midline


Cardiovascular: Yes: Regular Rate and Rhythm, S1, S2


Respiratory: Yes: CTA Bilaterally


Gastrointestinal: Yes: Normal Bowel Sounds, Soft


Labs: 


 CBC, BMP





 01/20/18 06:10 





 01/20/18 06:10 











Problem List





- Problems


(1) Intractable low back pain


Assessment/Plan: 


vertebroplasty percutaneous cement augmetation on wednesday with dr cristobal as 

outpatient-as patient was on aspirin last parr take on 1./23/18


pain control dvt ppx


PT 


snf discharge adn come back as outpatient to get it done


neurontin


tramadol


Code(s): M54.5 - LOW BACK PAIN

## 2018-01-27 RX ADMIN — GABAPENTIN SCH MG: 300 CAPSULE ORAL at 10:24

## 2018-01-27 RX ADMIN — LIDOCAINE SCH PATCH: 50 PATCH TOPICAL at 10:25

## 2018-01-27 RX ADMIN — Medication SCH TAB: at 10:24

## 2018-01-27 RX ADMIN — Medication SCH EACH: at 21:18

## 2018-01-27 RX ADMIN — GABAPENTIN SCH MG: 300 CAPSULE ORAL at 21:18

## 2018-01-27 RX ADMIN — ACETAMINOPHEN PRN MG: 325 TABLET ORAL at 15:11

## 2018-01-27 RX ADMIN — GABAPENTIN SCH MG: 300 CAPSULE ORAL at 17:38

## 2018-01-27 RX ADMIN — CALCITONIN SALMON SCH UNITS: 200 SPRAY, METERED NASAL at 10:24

## 2018-01-27 RX ADMIN — GABAPENTIN SCH MG: 300 CAPSULE ORAL at 13:54

## 2018-01-27 NOTE — PN
Progress Note, Physician





- Current Medication List


Current Medications: 


Active Medications





Acetaminophen (Tylenol -)  650 mg PO Q4H PRN


   PRN Reason: PAIN LEVEL 1 - 3


Calcitonin (Miacalcin [place] -)  200 units NS DAILY [facility]


   Last Admin: 01/27/18 10:24 Dose:  200 units


Calcium Carbonate/Cholecalciferol (Os-Vito 500+D -)  1 tab PO DAILY [facility]


   Last Admin: 01/27/18 10:24 Dose:  1 tab


Gabapentin (Neurontin -)  300 mg PO QID [facility]


   Last Admin: 01/27/18 10:24 Dose:  300 mg


Lidocaine (Lidoderm Patch -)  1 patch TP DAILY [facility]


   Last Admin: 01/27/18 10:25 Dose:  1 patch


Miscellaneous (Lidoderm Patch Removal)  1 each MC DAILY@2200 [facility]


   Last Admin: 01/26/18 21:47 Dose:  1 each


Morphine Sulfate (Morphine Injection -)  2 mg IVPUSH Q6H PRN


   PRN Reason: PAIN LEVEL 6-10


   Last Admin: 01/26/18 17:52 Dose:  2 mg


Ondansetron HCl (Zofran Injection)  4 mg IVPUSH Q6H PRN


   PRN Reason: NAUSEA AND/OR VOMITING


Polyethylene Glycol (Miralax (For Daily Use) -)  17 gm PO DAILY PRN


   PRN Reason: CONSTIPATION


   Last Admin: 01/24/18 10:26 Dose:  17 grams


Propranolol HCl (Inderal -)  20 mg PO TID [facility]


   Last Admin: 01/27/18 06:38 Dose:  20 mg


Tramadol HCl (Ultram -)  50 mg PO Q6H PRN


   PRN Reason: PAIN LEVEL 4 - 5


   Last Admin: 01/27/18 06:38 Dose:  50 mg











- Objective


Vital Signs: 


 Vital Signs











Temperature  98.8 F   01/27/18 10:12


 


Pulse Rate  94 H  01/27/18 10:12


 


Respiratory Rate  18   01/27/18 10:12


 


Blood Pressure  119/71   01/27/18 10:12


 


O2 Sat by Pulse Oximetry (%)  95   01/26/18 21:00











Cardiovascular: Yes: Regular Rate and Rhythm


Respiratory: Yes: Regular, CTA Bilaterally


Gastrointestinal: Yes: Normal Bowel Sounds, Soft


Labs: 


 CBC, BMP





 01/20/18 06:10 





 01/20/18 06:10 











Assessment/Plan





- Problems


(1) Intractable low back pain


Assessment/Plan: 


-Pain management


-Neurology consult appreciated


-rehab- refuses at the moment saying she has tried it in the past, it didn't 

help


-symptoms improved with gabapentin, increased dose


-PT


-TLSO brace ordered








Code(s): M54.5 - LOW BACK PAIN   





(2) Compression fracture of L4 lumbar vertebra


Assessment/Plan: 


MRI lumbar spine reviewed


-see by Neurology


-Physical therapy


-gabapentin


-IR EVAL


vertebroplasty percutaneous cement augmetation on wednesday with dr cristobal as 

outpatient-as patient was on aspirin last parr take on 1./23/18


pain control dvt ppx


PT 


snf discharge adn come back as outpatient to get it done


neurontin


tramadol


Code(s): S32.040A - WEDGE COMPRESSION FRACTURE OF FOURTH LUMBAR VERTEBRA, INIT 

  





(3) Sinus arrhythmia


Assessment/Plan: 


-seen by cardiology


-tele monitoring


-no other cardiac intervention recommended


Code(s): I49.9 - CARDIAC ARRHYTHMIA, UNSPECIFIED   








REFUSES SNF--PT THEN HOME WITH SERVICES

## 2018-01-28 LAB
APPEARANCE UR: (no result)
BACTERIA #/AREA URNS HPF: (no result) /HPF
BILIRUB UR STRIP.AUTO-MCNC: NEGATIVE MG/DL
COLOR UR: YELLOW
EPITH CASTS URNS QL MICRO: (no result) /HPF
KETONES UR QL STRIP: NEGATIVE
LEUKOCYTE ESTERASE UR QL STRIP.AUTO: (no result)
NITRITE UR QL STRIP: NEGATIVE
PH UR: 6 [PH] (ref 5–8)
PROT UR QL STRIP: NEGATIVE
PROT UR QL STRIP: NEGATIVE
RBC # UR STRIP: (no result) /UL
SP GR UR: 1.01 (ref 1–1.03)
UROBILINOGEN UR STRIP-MCNC: 2 MG/DL (ref 0.2–1)
YEAST #/AREA URNS HPF: (no result) /[HPF]

## 2018-01-28 RX ADMIN — CALCITONIN SALMON SCH UNITS: 200 SPRAY, METERED NASAL at 11:28

## 2018-01-28 RX ADMIN — Medication SCH EACH: at 21:33

## 2018-01-28 RX ADMIN — GABAPENTIN SCH MG: 300 CAPSULE ORAL at 11:32

## 2018-01-28 RX ADMIN — Medication SCH TAB: at 11:35

## 2018-01-28 RX ADMIN — ACETAMINOPHEN PRN MG: 325 TABLET ORAL at 05:38

## 2018-01-28 RX ADMIN — ACETAMINOPHEN PRN MG: 325 TABLET ORAL at 20:06

## 2018-01-28 RX ADMIN — GABAPENTIN SCH MG: 300 CAPSULE ORAL at 14:59

## 2018-01-28 RX ADMIN — GABAPENTIN SCH MG: 300 CAPSULE ORAL at 21:33

## 2018-01-28 RX ADMIN — LIDOCAINE SCH PATCH: 50 PATCH TOPICAL at 11:28

## 2018-01-28 RX ADMIN — GABAPENTIN SCH MG: 300 CAPSULE ORAL at 18:08

## 2018-01-28 NOTE — PN
Progress Note, Physician


History of Present Illness: 





back pain





- Current Medication List


Current Medications: 


Active Medications





Acetaminophen (Tylenol -)  650 mg PO Q4H PRN


   PRN Reason: PAIN LEVEL 1 - 3


   Last Admin: 01/28/18 05:38 Dose:  650 mg


Calcitonin (Miacalcin Spray -)  200 units NS DAILY [facility]


   Last Admin: 01/27/18 10:24 Dose:  200 units


Calcium Carbonate/Cholecalciferol (Os-Vito 500+D -)  1 tab PO DAILY [facility]


   Last Admin: 01/27/18 10:24 Dose:  1 tab


Gabapentin (Neurontin -)  300 mg PO QID [facility]


   Last Admin: 01/27/18 21:18 Dose:  300 mg


Lidocaine (Lidoderm Patch -)  1 patch TP DAILY [facility]


   Last Admin: 01/27/18 10:25 Dose:  1 patch


Miscellaneous (Lidoderm Patch Removal)  1 each MC DAILY@2200 [facility]


   Last Admin: 01/27/18 21:18 Dose:  1 each


Ondansetron HCl (Zofran Injection)  4 mg IVPUSH Q6H PRN


   PRN Reason: NAUSEA AND/OR VOMITING


Polyethylene Glycol (Miralax (For Daily Use) -)  17 gm PO DAILY PRN


   PRN Reason: CONSTIPATION


   Last Admin: 01/24/18 10:26 Dose:  17 grams


Propranolol HCl (Inderal -)  20 mg PO TID [facility]


   Last Admin: 01/28/18 06:20 Dose:  Not Given


Tramadol HCl (Ultram -)  50 mg PO Q6H PRN


   PRN Reason: PAIN LEVEL 1 - 3


   Last Admin: 01/27/18 21:18 Dose:  50 mg











- Objective


Vital Signs: 


 Vital Signs











Temperature  99.5 F   01/28/18 06:40


 


Pulse Rate  96 H  01/28/18 09:38


 


Respiratory Rate  20   01/28/18 09:38


 


Blood Pressure  99/54   01/28/18 09:38


 


O2 Sat by Pulse Oximetry (%)  95   01/27/18 21:00











Cardiovascular: Yes: S1, S2


Respiratory: Yes: Regular, CTA Bilaterally


Gastrointestinal: Yes: Normal Bowel Sounds, Soft


Labs: 


 CBC, BMP





 01/20/18 06:10 





 01/20/18 06:10 











Assessment/Plan





- Problems


(1) Intractable low back pain


Assessment/Plan: 


-Pain management


-Neurology consult appreciated


-rehab- refuses at the moment saying she has tried it in the past, it didn't 

help


-symptoms improved with gabapentin, increased dose


-PT


-TLSO brace ordered








Code(s): M54.5 - LOW BACK PAIN   





(2) Compression fracture of L4 lumbar vertebra


Assessment/Plan: 


MRI lumbar spine reviewed


-see by Neurology


-Physical therapy


-gabapentin


-IR EVAL


vertebroplasty percutaneous cement augmetation on wednesday with dr cristobal as 

outpatient-as patient was on aspirin last parr take on 1./23/18


pain control dvt ppx


PT 


snf discharge adn come back as outpatient to get it done


neurontin


tramadol


Code(s): S32.040A - WEDGE COMPRESSION FRACTURE OF FOURTH LUMBAR VERTEBRA, INIT 

  





(3) Sinus arrhythmia


Assessment/Plan: 


-seen by cardiology


-tele monitoring


-no other cardiac intervention recommended


Code(s): I49.9 - CARDIAC ARRHYTHMIA, UNSPECIFIED   








REFUSES SNF--PT THEN HOME WITH SERVICES

## 2018-01-28 NOTE — HOSP
Physical Examination


Vital Signs: 


 Vital Signs











Temperature  100.9 F H  01/28/18 17:14


 


Pulse Rate  100 H  01/28/18 17:14


 


Respiratory Rate  20   01/28/18 17:14


 


Blood Pressure  122/72   01/28/18 17:14


 


O2 Sat by Pulse Oximetry (%)  92 L  01/28/18 11:00











Findings/Remarks: 


Medical coverage for Dr. Lindquist


Called by RN that the patient was febrile to 100.9. Also noted fever 100.5 this 

AM. Patient was to be discharged now, discharged placed on hold. 


F/u UA, urine culture, blood culture, chest x-ray. 


Incentive spirometer


OOB to chair


Labs: 


 CBC, BMP





 01/20/18 06:10 





 01/20/18 06:10

## 2018-01-29 LAB
ALBUMIN SERPL-MCNC: 2.5 G/DL (ref 3.4–5)
ALP SERPL-CCNC: 95 U/L (ref 45–117)
ALT SERPL-CCNC: 14 U/L (ref 12–78)
ANION GAP SERPL CALC-SCNC: 9 MMOL/L (ref 8–16)
AST SERPL-CCNC: 19 U/L (ref 15–37)
BASOPHILS # BLD: 0.8 % (ref 0–2)
BILIRUB SERPL-MCNC: 0.4 MG/DL (ref 0.2–1)
BUN SERPL-MCNC: 18 MG/DL (ref 7–18)
CALCIUM SERPL-MCNC: 8.2 MG/DL (ref 8.5–10.1)
CHLORIDE SERPL-SCNC: 94 MMOL/L (ref 98–107)
CO2 SERPL-SCNC: 27 MMOL/L (ref 21–32)
CREAT SERPL-MCNC: 0.6 MG/DL (ref 0.55–1.02)
DEPRECATED RDW RBC AUTO: 13.5 % (ref 11.6–15.6)
EOSINOPHIL # BLD: 1.2 % (ref 0–4.5)
GLUCOSE SERPL-MCNC: 93 MG/DL (ref 74–106)
HCT VFR BLD CALC: 37.8 % (ref 32.4–45.2)
HGB BLD-MCNC: 13 GM/DL (ref 10.7–15.3)
LYMPHOCYTES # BLD: 12.3 % (ref 8–40)
MCH RBC QN AUTO: 30.3 PG (ref 25.7–33.7)
MCHC RBC AUTO-ENTMCNC: 34.3 G/DL (ref 32–36)
MCV RBC: 88.3 FL (ref 80–96)
MONOCYTES # BLD AUTO: 11 % (ref 3.8–10.2)
NEUTROPHILS # BLD: 74.7 % (ref 42.8–82.8)
PLATELET # BLD AUTO: 331 K/MM3 (ref 134–434)
PMV BLD: 7.1 FL (ref 7.5–11.1)
POTASSIUM SERPLBLD-SCNC: 4 MMOL/L (ref 3.5–5.1)
PROT SERPL-MCNC: 5.8 G/DL (ref 6.4–8.2)
RBC # BLD AUTO: 4.28 M/MM3 (ref 3.6–5.2)
SODIUM SERPL-SCNC: 130 MMOL/L (ref 136–145)
WBC # BLD AUTO: 5 K/MM3 (ref 4–10)

## 2018-01-29 RX ADMIN — GABAPENTIN SCH MG: 300 CAPSULE ORAL at 21:46

## 2018-01-29 RX ADMIN — ACETAMINOPHEN PRN MG: 325 TABLET ORAL at 14:59

## 2018-01-29 RX ADMIN — CALCITONIN SALMON SCH UNITS: 200 SPRAY, METERED NASAL at 09:20

## 2018-01-29 RX ADMIN — GABAPENTIN SCH MG: 300 CAPSULE ORAL at 09:18

## 2018-01-29 RX ADMIN — ONDANSETRON PRN MG: 2 INJECTION INTRAMUSCULAR; INTRAVENOUS at 21:45

## 2018-01-29 RX ADMIN — Medication SCH TAB: at 09:20

## 2018-01-29 RX ADMIN — OSELTAMIVIR PHOSPHATE SCH MG: 75 CAPSULE ORAL at 14:59

## 2018-01-29 RX ADMIN — GABAPENTIN SCH MG: 300 CAPSULE ORAL at 18:58

## 2018-01-29 RX ADMIN — OSELTAMIVIR PHOSPHATE SCH MG: 75 CAPSULE ORAL at 21:45

## 2018-01-29 RX ADMIN — Medication SCH: at 21:45

## 2018-01-29 RX ADMIN — GABAPENTIN SCH MG: 300 CAPSULE ORAL at 14:59

## 2018-01-29 RX ADMIN — ONDANSETRON PRN MG: 2 INJECTION INTRAMUSCULAR; INTRAVENOUS at 11:48

## 2018-01-29 RX ADMIN — LIDOCAINE SCH PATCH: 50 PATCH TOPICAL at 09:17

## 2018-01-29 NOTE — CON.ID
Consult


Consult Specialty:: infectious disease


Referred by:: dr goldberg


Reason for Consultation:: fever





- History of Present Illness


Chief Complaint: fever, cough, sore throat, nausea


History of Present Illness: 





88 year old female admitted with worsening back pain 1/10-she was seen by 

neurology and neurosurgery- MRI/CT of the back showed osteopenia and


multiple compression fractures.


She was scheduled for discahrge to SNF yesterday but developed fever.


no chills or rigors


+BM- normal, noconstipation or diarrhea


nausea which started today, no vomiting


no dysuria


+sore throat for several days


+cough











- History Source


History Provided By: Patient, Medical Record


Limitations to Obtaining History: No Limitations





- Past Medical History


Cardio/Vascular: Yes: HTN


Gastrointestinal: Yes: GERD


Musculoskeletal: Yes: Chronic low back pain


Endocrine: Yes: Osteopenia





- Past Surgical History


Past Surgical History: Yes: Appendectomy, Laminectomy





- Alcohol/Substance Use


Hx Alcohol Use: No





- Smoking History


Smoking history: Never smoked


Have you smoked in the past 12 months: No


Aproximately how many cigarettes per day: 0





- Social History


Usual Living Arrangement: Alone


ADL: Support Services ([facility])


Place of Birth: [place]


History of Recent Travel: No





Home Medications





- Allergies


Allergies/Adverse Reactions: 


 Allergies











Allergy/AdvReac Type Severity Reaction Status Date / Time


 


Sulfa (Sulfonamide Allergy   Verified 01/19/18 21:40





Antibiotics)     


 


acetaminophen [From Tylenol] AdvReac   Verified 01/19/18 21:40


 


oxycodone HCl AdvReac  Vomiting Verified 01/19/18 21:40





[From Roxicodone]     














- Home Medications


Home Medications: 


Ambulatory Orders





Polyethylene Glycol 3350 [Miralax 119 gm Btl -] 17 gm PO DAILY PRN 30 Days  

bottle 01/23/15 


Calcium 500Mg/Vit-D 200 Units [Os-Vito 500+D -] 1 combo PO DAILY #0 tablet 04/14/

15 


Zolpidem Tartrate [Ambien] 10 mg PO HS PRN #0 tablet 04/14/15 


Propranolol HCl [Inderal -] 20 mg PO TID 04/22/17 


Alendronate Na [Fosamax (Weekly)] 70 mg PO Q7D 01/16/18 


Calcitonin-[place] [Miacalcin [place] -] 200 units NS DAILY #1 spray.pump 01/24/18 


Lidocaine 5% Patch [Lidoderm -] 1 patch TP DAILY #30 patch 01/24/18 


Gabapentin [Neurontin -] 300 mg PO QID #20 capsule MDD 3 01/26/18 


Lidocaine Patch Removal [Lidoderm Patch Removal] 1 each MC DAILY@2200  each 01/ 26/18 


Tramadol HCl [Ultram -] 50 mg PO Q6H PRN 7 Days #10 tablet MDD 3 01/26/18 











Family Disease History





- Family Disease History


Family History: Denies





Review of Systems





- Review of Systems


Constitutional: reports: Fever


Eyes: reports: No Symptoms


HENT: reports: Throat Pain


Neck: reports: No Symptoms


Cardiovascular: reports: No Symptoms.  denies: Chest Pain, Edema, Palpitations, 

Shortness of Breath


Respiratory: reports: Cough


Gastrointestinal: reports: No Symptoms.  denies: Constipation, Diarrhea, 

Vomiting


Genitourinary: reports: No Symptoms


Breasts: reports: No Symptoms Reported


Musculoskeletal: reports: Back Pain


Integumentary: reports: No Symptoms





Physical Exam


Vital Signs: 


 Vital Signs











Temperature  99.6 F   01/29/18 09:00


 


Pulse Rate  92 H  01/29/18 09:00


 


Respiratory Rate  18   01/29/18 09:00


 


Blood Pressure  99/66   01/29/18 09:00


 


O2 Sat by Pulse Oximetry (%)  92 L  01/29/18 09:24











Constitutional: Yes: Well Nourished, No Distress, Calm


Eyes: Yes: Conjunctiva Clear


HENT: Yes: Atraumatic, Normocephalic, Pharyngeal Erythema.  No: Thrush, 

Tonsillar Exudate


Neck: Yes: WNL, Supple


Cardiovascular: Yes: Regular Rate and Rhythm


Respiratory: Yes: Regular, Wheezes (scattered)


Gastrointestinal: Yes: Normal Bowel Sounds, Soft


...Rectal Exam: Yes: Deferred


Extremities: Yes: WNL


Edema: No


Psychiatric: Yes: Alert, Oriented


Labs: 


 CBC, BMP





 01/28/18 23:35 





 01/28/18 23:35 





cultures pending





Imaging





- Results


Chest X-ray: Report Reviewed, Image Reviewed (atelectasis right lung lower lobe)





Problem List





- Problems


(1) Fever


Code(s): R50.9 - FEVER, UNSPECIFIED   





(2) Back pain


Code(s): M54.9 - DORSALGIA, UNSPECIFIED   


Qualifiers: 


   Back pain location: low back pain   Chronicity: chronic   Back pain 

laterality: bilateral   Sciatica presence: without sciatica   Qualified Code(s)

: M54.5 - Low back pain; G89.29 - Other chronic pain; G89.29 - Other chronic 

pain   





Assessment/Plan





fever/cough/sore throat


send throat culture and influenza screen


no clear pneumonia on cxray


would screen for influenza


send throat culture as well


?uti- +wbc on UA


r/o influenza





would treat with rocephin


f/u culture


incentive spirometry

## 2018-01-29 NOTE — PN
Progress Note, Physician


Chief Complaint: 





Intractable back pain


History of Present Illness: 





Pain management


seen by Neurology


started on Gabapentin


-was supposed to be discharged to [facility], cancelled d/c due to temperature 

spike


evaluated by Hospitalist


CXR shows rll atelectasis


BC pending








- Current Medication List


Current Medications: 


Active Medications





Acetaminophen (Tylenol -)  650 mg PO Q4H PRN


   PRN Reason: PAIN LEVEL 1 - 3


   Last Admin: 01/28/18 20:06 Dose:  650 mg


Calcitonin (Miacalcin Spray -)  200 units NS DAILY [facility]


   Last Admin: 01/29/18 09:20 Dose:  200 units


Calcium Carbonate/Cholecalciferol (Os-Vito 500+D -)  1 tab PO DAILY [facility]


   Last Admin: 01/29/18 09:20 Dose:  1 tab


Gabapentin (Neurontin -)  300 mg PO QID [facility]


   Last Admin: 01/29/18 09:18 Dose:  300 mg


Levofloxacin (Levaquin 500 Mg Premixed Ivpb -)  500 mg in 100 mls @ 100 mls/hr 

IVPB ONCE ONE


   Stop: 01/29/18 11:24


Lidocaine (Lidoderm Patch -)  1 patch TP DAILY [facility]


   Last Admin: 01/29/18 09:17 Dose:  1 patch


Miscellaneous (Lidoderm Patch Removal)  1 each MC DAILY@2200 [facility]


   Last Admin: 01/28/18 21:33 Dose:  1 each


Ondansetron HCl (Zofran Injection)  4 mg IVPUSH Q6H PRN


   PRN Reason: NAUSEA AND/OR VOMITING


Polyethylene Glycol (Miralax (For Daily Use) -)  17 gm PO DAILY PRN


   PRN Reason: CONSTIPATION


   Last Admin: 01/24/18 10:26 Dose:  17 grams


Propranolol HCl (Inderal -)  20 mg PO TID [facility]


   Last Admin: 01/29/18 05:50 Dose:  Not Given


Tramadol HCl (Ultram -)  50 mg PO Q6H PRN


   PRN Reason: PAIN LEVEL 1 - 3


   Last Admin: 01/29/18 09:19 Dose:  50 mg











- Objective


Vital Signs: 


 Vital Signs











Temperature  99.6 F   01/29/18 09:00


 


Pulse Rate  92 H  01/29/18 09:00


 


Respiratory Rate  18   01/29/18 09:00


 


Blood Pressure  99/66   01/29/18 09:00


 


O2 Sat by Pulse Oximetry (%)  92 L  01/29/18 09:24











Constitutional: Yes: Well Nourished, No Distress, Calm


Cardiovascular: Yes: Regular Rate and Rhythm


Respiratory: Yes: Regular


Gastrointestinal: Yes: Normal Bowel Sounds, Soft


Musculoskeletal: Yes: Back Pain


Extremities: Yes: WNL


Edema: No


Peripheral Pulses WNL: Yes


Neurological: Yes: Alert, Oriented


Psychiatric: Yes: Alert, Oriented


Labs: 


 CBC, BMP





 01/28/18 23:35 





 01/28/18 23:35 











Problem List





- Problems


(1) Intractable low back pain


Assessment/Plan: 


-Pain management


-Neurology consult appreciated


[facility]


-symptoms improved with gabapentin, increased dose


-PT


-TLSO brace ordered, but did not tolerate


-tolerating ab binder


Code(s): M54.5 - LOW BACK PAIN   





(2) Compression fracture of L4 lumbar vertebra


Assessment/Plan: 


MRI lumbar spine reviewed


-see by Neurology


-Physical therapy


-gabapentin


Code(s): S32.040A - WEDGE COMPRESSION FRACTURE OF FOURTH LUMBAR VERTEBRA, INIT 

  





(3) Sinus arrhythmia


Assessment/Plan: 


-seen by cardiology


-tele monitoring


-no other cardiac intervention recommended


Code(s): I49.9 - CARDIAC ARRHYTHMIA, UNSPECIFIED   





(4) Atelectasis of right lung


Assessment/Plan: 


r/o PNE-Levaquin x 1 dose


-ID consult


-may be able to be discharged on PO abx


Code(s): J98.11 - ATELECTASIS   





Assessment/Plan





see problem list

## 2018-01-30 LAB
INR BLD: 1.19 (ref 0.82–1.09)
PT PNL PPP: 13.5 SEC (ref 9.98–11.88)

## 2018-01-30 RX ADMIN — CALCITONIN SALMON SCH UNITS: 200 SPRAY, METERED NASAL at 10:02

## 2018-01-30 RX ADMIN — GUAIFENESIN AND CODEINE PHOSPHATE PRN ML: 10; 100 LIQUID ORAL at 17:22

## 2018-01-30 RX ADMIN — GABAPENTIN SCH MG: 300 CAPSULE ORAL at 21:52

## 2018-01-30 RX ADMIN — GABAPENTIN SCH MG: 300 CAPSULE ORAL at 10:03

## 2018-01-30 RX ADMIN — GABAPENTIN SCH MG: 300 CAPSULE ORAL at 17:22

## 2018-01-30 RX ADMIN — LIDOCAINE SCH PATCH: 50 PATCH TOPICAL at 10:02

## 2018-01-30 RX ADMIN — Medication SCH: at 21:55

## 2018-01-30 RX ADMIN — SODIUM CHLORIDE SCH MLS/HR: 9 INJECTION, SOLUTION INTRAVENOUS at 14:13

## 2018-01-30 RX ADMIN — GABAPENTIN SCH MG: 300 CAPSULE ORAL at 14:13

## 2018-01-30 RX ADMIN — OSELTAMIVIR PHOSPHATE SCH MG: 75 CAPSULE ORAL at 21:52

## 2018-01-30 RX ADMIN — Medication SCH TAB: at 10:04

## 2018-01-30 RX ADMIN — OSELTAMIVIR PHOSPHATE SCH MG: 75 CAPSULE ORAL at 10:04

## 2018-01-30 NOTE — PN
Progress Note (short form)





- Note


Progress Note: 





feels improved


still coughing


influenza A positive





 Vital Signs











 Period  Temp  Pulse  Resp  BP Sys/Silva  Pulse Ox


 


 Last 24 Hr  97.9 F-98.5 F  72-92  18-20  /57-75  93-94








cor-rrr


lungs decreased bs at bases


abd soft,nt


ext no edema





 CBC, BMP





 01/28/18 23:35 





 01/28/18 23:35 





 Microbiology





01/28/18 18:30   Urine - Urine Clean Catch   Urine Culture - Final


                            Contaminated: Please Repeat


01/29/18 12:38   Throat   Throat Culture - Final


                            NO BETA HEMOLYTIC STREPTOCOCCI ISOLATED


01/29/18 12:38   Throat   Group A Strep Rapid Antigen - Final


01/28/18 17:54   Blood - Peripheral Venous   Blood Culture - Preliminary


                            NO GROWTH OBTAINED AFTER 24 HOURS, INCUBATION TO 

CONTINUE


                            FOR 4 DAYS.


01/28/18 17:45   Blood - Peripheral Venous   Blood Culture - Preliminary


                            NO GROWTH OBTAINED AFTER 24 HOURS, INCUBATION TO 

CONTINUE


                            FOR 4 DAYS.


01/29/18 12:38   Nasopharyngeal Swab   Influenza Types A,B Antigen (JOSE M) - Final


01/29/18 12:38   Nasopharyngeal Swab    - Final





a/p


fever due to influenza A


complete 5 days tamiflu


d/c rocephin


please call back if needed





Problem List





- Problems


(1) Fever


Code(s): R50.9 - FEVER, UNSPECIFIED   





(2) Back pain


Code(s): M54.9 - DORSALGIA, UNSPECIFIED   


Qualifiers: 


   Back pain location: low back pain   Chronicity: chronic   Back pain 

laterality: bilateral   Sciatica presence: without sciatica   Qualified Code(s)

: M54.5 - Low back pain; G89.29 - Other chronic pain; G89.29 - Other chronic 

pain

## 2018-01-30 NOTE — PN
Progress Note, Physician


Chief Complaint: 





patient on isolation on tamiflu- for 7 days 


supposed to get kyphoplasty tmw but will hold off till she feels better and 

tamiflu course is done





- Current Medication List


Current Medications: 


Active Medications





Acetaminophen (Tylenol -)  650 mg PO Q4H PRN


   PRN Reason: PAIN LEVEL 1 - 3


   Last Admin: 01/29/18 14:59 Dose:  650 mg


Calcitonin (Miacalcin Spray -)  200 units NS DAILY [facility]


   Last Admin: 01/30/18 10:02 Dose:  200 units


Calcium Carbonate/Cholecalciferol (Os-Vito 500+D -)  1 tab PO DAILY [facility]


   Last Admin: 01/30/18 10:04 Dose:  1 tab


Gabapentin (Neurontin -)  300 mg PO QID [facility]


   Last Admin: 01/30/18 10:03 Dose:  300 mg


Guaifenesin/Codeine Phosphate (Robitussin Ac -)  5 ml PO TID PRN


   PRN Reason: COUGH


CEFTRIAXONE 1 G/50 ML PREMIX (Ceftriaxone 1 Gm-D5w Bag)  50 mls @ 100 mls/hr 

IVPB DAILY [facility]


   Last Admin: 01/30/18 10:03 Dose:  100 mls/hr


Lidocaine (Lidoderm Patch -)  1 patch TP DAILY [facility]


   Last Admin: 01/30/18 10:02 Dose:  1 patch


Miscellaneous (Lidoderm Patch Removal)  1 each MC DAILY@2200 [facility]


   Last Admin: 01/29/18 21:45 Dose:  Not Given


Ondansetron HCl (Zofran Injection)  4 mg IVPUSH Q6H PRN


   PRN Reason: NAUSEA AND/OR VOMITING


   Last Admin: 01/29/18 21:45 Dose:  4 mg


Oseltamivir Phosphate (Tamiflu -)  75 mg PO BID [facility]


   Stop: 02/03/18 14:14


   Last Admin: 01/30/18 10:04 Dose:  75 mg


Polyethylene Glycol (Miralax (For Daily Use) -)  17 gm PO DAILY PRN


   PRN Reason: CONSTIPATION


   Last Admin: 01/24/18 10:26 Dose:  17 grams


Propranolol HCl (Inderal -)  20 mg PO BID [facility]


   Last Admin: 01/30/18 10:03 Dose:  20 mg


Tramadol HCl (Ultram -)  50 mg PO Q6H PRN


   PRN Reason: PAIN LEVEL 1 - 3


   Last Admin: 01/30/18 06:52 Dose:  50 mg











- Objective


Vital Signs: 


 Vital Signs











Temperature  97.9 F   01/30/18 09:00


 


Pulse Rate  88   01/30/18 09:00


 


Respiratory Rate  18   01/30/18 09:00


 


Blood Pressure  119/67   01/30/18 09:00


 


O2 Sat by Pulse Oximetry (%)  94 L  01/29/18 21:00








complaining of back pain


Constitutional: Yes: Calm


Cardiovascular: Yes: Regular Rate and Rhythm, S1, S2


Respiratory: Yes: CTA Bilaterally


Gastrointestinal: Yes: Normal Bowel Sounds, Soft


Musculoskeletal: Yes: Back Pain


Labs: 


 CBC, BMP





 01/28/18 23:35 





 01/28/18 23:35 





 INR, PTT











INR  1.19  (0.82-1.09)  H  01/30/18  09:55    














Problem List





- Problems


(1) Intractable low back pain


Assessment/Plan: 


vertebroplasty percutaneous cement augmetation was schedule for t[facility] but cannot 

be done as patient is sick with the flu


will delay for another week


gabapentin


aspirin on hold 


tramadol


Code(s): M54.5 - LOW BACK PAIN   





(2) Influenza A


Assessment/Plan: 


isolation for 7 days


tamiflu


Code(s): J10.1 - FLU DUE TO OTH IDENT INFLUENZA VIRUS W OTH RESP MANIFEST   





Assessment/Plan





patient not ready for discharge will need 7 days of isolation once her 

treatment is done 


and off isolation then can get kyphoplasty and go to snf

## 2018-01-30 NOTE — CONSULT
Consult


Consult Specialty:: Nephrology


Reason for Consultation:: hyponatremia





- History of Present Illness


Chief Complaint: initially presented with back pain


History of Present Illness: 





Pt is an 88 year old female with pmhx of spine injury and hyperlpidemia who 

initially presented with back pain. She still complains of lower back pain. I 

was called to evaluate her for hyponatremia. She says she has had poor appetite 

for the last few martinez. She denies nausea or vomiting. She denies diarrhea. She 

is awake and alert. She denies history of hyponatremia. She is was in nsaids 

for pain before admission. 





- History Source


History Provided By: Patient, Medical Record





- Past Medical History


Cardio/Vascular: Yes: HTN


Gastrointestinal: Yes: GERD


Musculoskeletal: Yes: Chronic low back pain


Endocrine: Yes: Osteopenia





- Past Surgical History


Past Surgical History: Yes: Appendectomy, Laminectomy





- Alcohol/Substance Use


Hx Alcohol Use: No





- Smoking History


Smoking history: Never smoked


Have you smoked in the past 12 months: No


Aproximately how many cigarettes per day: 0





- Social History


Usual Living Arrangement: Alone


ADL: Support Services ([facility])


History of Recent Travel: No





Home Medications





- Allergies


Allergies/Adverse Reactions: 


 Allergies











Allergy/AdvReac Type Severity Reaction Status Date / Time


 


Sulfa (Sulfonamide Allergy   Verified 01/19/18 21:40





Antibiotics)     


 


acetaminophen [From Tylenol] AdvReac   Verified 01/19/18 21:40


 


oxycodone HCl AdvReac  Vomiting Verified 01/19/18 21:40





[From Roxicodone]     














- Home Medications


Home Medications: 


Ambulatory Orders





Polyethylene Glycol 3350 [Miralax 119 gm Btl -] 17 gm PO DAILY PRN 30 Days  

bottle 01/23/15 


Calcium 500Mg/Vit-D 200 Units [Os-Vito 500+D -] 1 combo PO DAILY #0 tablet 04/14/

15 


Zolpidem Tartrate [Ambien] 10 mg PO HS PRN #0 tablet 04/14/15 


Propranolol HCl [Inderal -] 20 mg PO TID 04/22/17 


Alendronate Na [Fosamax (Weekly)] 70 mg PO Q7D 01/16/18 


Calcitonin-Ona [Miacalcin Gates -] 200 units NS DAILY #1 spray.pump 01/24/18 


Lidocaine 5% Patch [Lidoderm -] 1 patch TP DAILY #30 patch 01/24/18 


Gabapentin [Neurontin -] 300 mg PO QID #20 capsule MDD 3 01/26/18 


Lidocaine Patch Removal [Lidoderm Patch Removal] 1 each MC DAILY@2200  each 01/ 26/18 


Tramadol HCl [Ultram -] 50 mg PO Q6H PRN 7 Days #10 tablet MDD 3 01/26/18 











Family Disease History





- Family Disease History


Family History: Denies





Review of Systems





- Review of Systems


Constitutional: reports: Malaise


Eyes: reports: No Symptoms


HENT: reports: No Symptoms


Neck: reports: No Symptoms


Cardiovascular: reports: No Symptoms


Respiratory: reports: No Symptoms


Gastrointestinal: reports: No Symptoms


Genitourinary: reports: No Symptoms


Musculoskeletal: reports: Back Pain


Integumentary: reports: No Symptoms


Neurological: reports: No Symptoms


Endocrine: reports: No Symptoms





Physical Exam


Vital Signs: 


 Vital Signs











Temperature  98.0 F   01/30/18 16:02


 


Pulse Rate  80   01/30/18 16:02


 


Respiratory Rate  18   01/30/18 16:02


 


Blood Pressure  98/54   01/30/18 16:02


 


O2 Sat by Pulse Oximetry (%)  93 L  01/30/18 10:00











Constitutional: Yes: Calm


Eyes: Yes: Conjunctiva Clear


HENT: Yes: Atraumatic


Neck: Yes: Supple


Cardiovascular: Yes: S1, S2


Respiratory: Yes: CTA Bilaterally


Gastrointestinal: Yes: Normal Bowel Sounds, Soft


Renal/: Yes: WNL


Musculoskeletal: Yes: Back Pain


Edema: No


Neurological: Yes: Oriented


Psychiatric: Yes: Oriented


Labs: 


 CBC, BMP





 01/28/18 23:35 





 01/28/18 23:35 





 Laboratory Tests











  01/19/18 01/20/18 01/28/18





  22:39 06:10 18:30


 


WBC   


 


Hgb   


 


Plt Count   


 


Sodium  132 L  136 


 


Potassium   


 


Chloride   


 


Carbon Dioxide   


 


Anion Gap   


 


BUN   


 


Creatinine  0.6  0.5 L 


 


Urine Color    Yellow


 


Urine Protein    Negative


 


Urine Blood    1+ H














  01/28/18 01/28/18





  23:35 23:35


 


WBC  5.0 


 


Hgb  13.0 


 


Plt Count  331 


 


Sodium   130 L


 


Potassium   4.0


 


Chloride   94 L


 


Carbon Dioxide   27


 


Anion Gap   9


 


BUN   18


 


Creatinine   0.6


 


Urine Color  


 


Urine Protein  


 


Urine Blood  














Imaging





- Results


Chest X-ray: Report Reviewed





Problem List





- Problems


(1) Hyponatremia


Code(s): E87.1 - HYPO-OSMOLALITY AND HYPONATREMIA   





(2) Atelectasis of right lung


Code(s): J98.11 - ATELECTASIS   





(3) Back pain


Code(s): M54.9 - DORSALGIA, UNSPECIFIED   


Qualifiers: 


   Back pain location: low back pain   Chronicity: chronic   Back pain 

laterality: bilateral   Sciatica presence: without sciatica   Qualified Code(s)

: M54.5 - Low back pain; G89.29 - Other chronic pain; G89.29 - Other chronic 

pain   





(4) HTN (hypertension)


Code(s): I10 - ESSENTIAL (PRIMARY) HYPERTENSION   





Assessment/Plan





 Current Medications











Generic Name Dose Route Start Last Admin





  Trade Name Freq  PRN Reason Stop Dose Admin


 


Acetaminophen  650 mg  01/24/18 15:07  01/29/18 14:59





  Tylenol -  PO   650 mg





  Q4H PRN   Administration





  PAIN LEVEL 1 - 3   


 


Calcitonin  200 units  01/20/18 10:00  01/30/18 10:02





  Miacalcin Spray -  NS   200 units





  DAILY TEMI   Administration


 


Calcium Carbonate/Cholecalciferol  1 tab  01/20/18 10:00  01/30/18 10:04





  Os-Vito 500+D -  PO   1 tab





  DAILY TEMI   Administration


 


Gabapentin  300 mg  01/25/18 10:00  01/30/18 14:13





  Neurontin -  PO   300 mg





  QID TEMI   Administration


 


Guaifenesin/Codeine Phosphate  5 ml  01/29/18 14:10  





  Robitussin Ac -  PO   





  TID PRN   





  COUGH   


 


Sodium Chloride  1,000 mls @ 75 mls/hr  01/30/18 12:00  01/30/18 14:13





  Normal Saline -  IV   75 mls/hr





  ASDIR TEMI   Administration


 


Lidocaine  1 patch  01/20/18 11:00  01/30/18 10:02





  Lidoderm Patch -  TP   1 patch





  DAILY TEMI   Administration


 


Miscellaneous  1 each  01/20/18 22:00  01/29/18 21:45





  Lidoderm Patch Removal  MC   Not Given





  DAILY@2200 [facility]I   


 


Ondansetron HCl  4 mg  01/20/18 09:09  01/29/18 21:45





  Zofran Injection  IVPUSH   4 mg





  Q6H PRN   Administration





  NAUSEA AND/OR VOMITING   


 


Oseltamivir Phosphate  75 mg  01/29/18 14:15  01/30/18 10:04





  Tamiflu -  PO  02/03/18 14:14  75 mg





  BID TEMI   Administration


 


Polyethylene Glycol  17 gm  01/20/18 10:00  01/24/18 10:26





  Miralax (For Daily Use) -  PO   17 grams





  DAILY PRN   Administration





  CONSTIPATION   


 


Propranolol HCl  20 mg  01/29/18 22:00  01/30/18 10:03





  Inderal -  PO   20 mg





  BID TEMI   Administration


 


Tramadol HCl  50 mg  01/27/18 15:55  01/30/18 14:12





  Ultram -  PO   50 mg





  Q6H PRN   Administration





  PAIN LEVEL 1 - 3   








Impression


1. hyponatremia


2. fever


3. back pain


4. htn


5. hyperlipidemia


6. influenza





Plan


- check ua, urine sodium, plasma osm and urine osm


- check cortisol and tsh


- repeat labs in am


- can be more liberal with salt intake


- encourage PO intake


- will follow


Dr Mcfarlane

## 2018-01-31 LAB
ALBUMIN SERPL-MCNC: 2.3 G/DL (ref 3.4–5)
ALP SERPL-CCNC: 86 U/L (ref 45–117)
ALT SERPL-CCNC: 15 U/L (ref 12–78)
ANION GAP SERPL CALC-SCNC: 13 MMOL/L (ref 8–16)
ANION GAP SERPL CALC-SCNC: 8 MMOL/L (ref 8–16)
APPEARANCE UR: CLEAR
AST SERPL-CCNC: 25 U/L (ref 15–37)
BILIRUB SERPL-MCNC: 0.2 MG/DL (ref 0.2–1)
BILIRUB UR STRIP.AUTO-MCNC: NEGATIVE MG/DL
BUN SERPL-MCNC: 15 MG/DL (ref 7–18)
BUN SERPL-MCNC: 15 MG/DL (ref 7–18)
CALCIUM SERPL-MCNC: 7.3 MG/DL (ref 8.5–10.1)
CALCIUM SERPL-MCNC: 7.4 MG/DL (ref 8.5–10.1)
CHLORIDE SERPL-SCNC: 101 MMOL/L (ref 98–107)
CHLORIDE SERPL-SCNC: 98 MMOL/L (ref 98–107)
CO2 SERPL-SCNC: 20 MMOL/L (ref 21–32)
CO2 SERPL-SCNC: 26 MMOL/L (ref 21–32)
COLOR UR: YELLOW
CREAT SERPL-MCNC: 0.5 MG/DL (ref 0.55–1.02)
CREAT SERPL-MCNC: 0.6 MG/DL (ref 0.55–1.02)
GLUCOSE SERPL-MCNC: 77 MG/DL (ref 74–106)
GLUCOSE SERPL-MCNC: 82 MG/DL (ref 74–106)
KETONES UR QL STRIP: NEGATIVE
LEUKOCYTE ESTERASE UR QL STRIP.AUTO: NEGATIVE
NITRITE UR QL STRIP: NEGATIVE
OSMOLALITY SERPL: 267 MOSM/KG (ref 278–305)
PH UR: 6 [PH] (ref 5–8)
POTASSIUM SERPLBLD-SCNC: 4.2 MMOL/L (ref 3.5–5.1)
POTASSIUM SERPLBLD-SCNC: 4.3 MMOL/L (ref 3.5–5.1)
PROT SERPL-MCNC: 5.3 G/DL (ref 6.4–8.2)
PROT UR QL STRIP: NEGATIVE
PROT UR QL STRIP: NEGATIVE
RBC # UR STRIP: NEGATIVE /UL
SODIUM SERPL-SCNC: 132 MMOL/L (ref 136–145)
SODIUM SERPL-SCNC: 134 MMOL/L (ref 136–145)
SP GR UR: 1.01 (ref 1–1.03)
UROBILINOGEN UR STRIP-MCNC: 2 MG/DL (ref 0.2–1)

## 2018-01-31 RX ADMIN — LIDOCAINE SCH PATCH: 50 PATCH TOPICAL at 10:22

## 2018-01-31 RX ADMIN — POLYETHYLENE GLYCOL 3350 PRN GRAMS: 17 POWDER, FOR SOLUTION ORAL at 10:24

## 2018-01-31 RX ADMIN — OSELTAMIVIR PHOSPHATE SCH MG: 75 CAPSULE ORAL at 23:09

## 2018-01-31 RX ADMIN — OSELTAMIVIR PHOSPHATE SCH MG: 75 CAPSULE ORAL at 10:22

## 2018-01-31 RX ADMIN — GABAPENTIN SCH MG: 300 CAPSULE ORAL at 23:09

## 2018-01-31 RX ADMIN — GABAPENTIN SCH MG: 300 CAPSULE ORAL at 13:58

## 2018-01-31 RX ADMIN — ONDANSETRON PRN MG: 2 INJECTION INTRAMUSCULAR; INTRAVENOUS at 13:58

## 2018-01-31 RX ADMIN — Medication SCH TAB: at 10:22

## 2018-01-31 RX ADMIN — SODIUM CHLORIDE SCH: 9 INJECTION, SOLUTION INTRAVENOUS at 14:58

## 2018-01-31 RX ADMIN — CALCITONIN SALMON SCH UNITS: 200 SPRAY, METERED NASAL at 10:24

## 2018-01-31 RX ADMIN — ACETAMINOPHEN PRN MG: 325 TABLET ORAL at 13:58

## 2018-01-31 RX ADMIN — GABAPENTIN SCH MG: 300 CAPSULE ORAL at 17:58

## 2018-01-31 RX ADMIN — SODIUM CHLORIDE SCH MLS/HR: 9 INJECTION, SOLUTION INTRAVENOUS at 06:33

## 2018-01-31 RX ADMIN — SODIUM CHLORIDE SCH MLS/HR: 9 INJECTION, SOLUTION INTRAVENOUS at 23:14

## 2018-01-31 RX ADMIN — GABAPENTIN SCH MG: 300 CAPSULE ORAL at 10:22

## 2018-01-31 RX ADMIN — Medication SCH EACH: at 23:09

## 2018-01-31 NOTE — PN
Progress Note (short form)





- Note


Progress Note: 


Patient seen and evaluated at bedside this morning. She currently has the flu 

and being treated with antiviral medication. She reported back pain and 

discomfort lying on her back. She is able to ambulate with assistance as 

needed. She is awaiting discharge to rehab facility today. As per 

recommendations by Dr. Scherer, and confirmation with plan of care - plan for 

vertebroplasty once she is off aspirin for 5 days. Continue use of TLSO or 

abdominal binder if TLSO is too cumbersome.Will continue to follow. Stable, no 

acute distress.

## 2018-01-31 NOTE — PN
Progress Note, Physician


Chief Complaint: 





Intractable back pain


History of Present Illness: 





NAD, tired today


awaiting completion of 7 days of isolation before discharge to rehab





- Current Medication List


Current Medications: 


Active Medications





Acetaminophen (Tylenol -)  650 mg PO Q4H PRN


   PRN Reason: PAIN LEVEL 1 - 3


   Last Admin: 01/29/18 14:59 Dose:  650 mg


Calcitonin (Miacalcin Spray -)  200 units NS DAILY [facility]


   Last Admin: 01/31/18 10:24 Dose:  200 units


Calcium Carbonate/Cholecalciferol (Os-Vito 500+D -)  1 tab PO DAILY [facility]


   Last Admin: 01/31/18 10:22 Dose:  1 tab


Gabapentin (Neurontin -)  300 mg PO QID [facility]


   Last Admin: 01/31/18 10:22 Dose:  300 mg


Guaifenesin/Codeine Phosphate (Robitussin Ac -)  5 ml PO TID PRN


   PRN Reason: COUGH


   Last Admin: 01/30/18 17:22 Dose:  5 ml


Sodium Chloride (Normal Saline -)  1,000 mls @ 75 mls/hr IV ASDIR [facility]


   Last Admin: 01/31/18 06:33 Dose:  75 mls/hr


Lidocaine (Lidoderm Patch -)  1 patch TP DAILY [facility]


   Last Admin: 01/31/18 10:22 Dose:  1 patch


Miscellaneous (Lidoderm Patch Removal)  1 each MC DAILY@2200 [facility]


   Last Admin: 01/30/18 21:55 Dose:  Not Given


Ondansetron HCl (Zofran Injection)  4 mg IVPUSH Q6H PRN


   PRN Reason: NAUSEA AND/OR VOMITING


   Last Admin: 01/29/18 21:45 Dose:  4 mg


Oseltamivir Phosphate (Tamiflu -)  75 mg PO BID [facility]


   Stop: 02/03/18 14:14


   Last Admin: 01/31/18 10:22 Dose:  75 mg


Polyethylene Glycol (Miralax (For Daily Use) -)  17 gm PO DAILY PRN


   PRN Reason: CONSTIPATION


   Last Admin: 01/31/18 10:24 Dose:  17 grams


Propranolol HCl (Inderal -)  20 mg PO BID [facility]


   Last Admin: 01/31/18 10:22 Dose:  20 mg


Tramadol HCl (Ultram -)  50 mg PO Q6H PRN


   PRN Reason: PAIN LEVEL 1 - 3


   Last Admin: 01/31/18 10:22 Dose:  50 mg











- Objective


Vital Signs: 


 Vital Signs











Temperature  98.0 F   01/31/18 10:00


 


Pulse Rate  81   01/31/18 10:00


 


Respiratory Rate  20   01/31/18 10:00


 


Blood Pressure  104/53   01/31/18 10:00


 


O2 Sat by Pulse Oximetry (%)  93 L  01/30/18 21:00











Constitutional: Yes: Well Nourished, No Distress, Calm


Cardiovascular: Yes: Regular Rate and Rhythm


Respiratory: Yes: Regular


Gastrointestinal: Yes: WNL


Musculoskeletal: Yes: Back Pain, Muscle Weakness


Extremities: Yes: WNL


Edema: No


Peripheral Pulses WNL: Yes


Neurological: Yes: Alert, Oriented


Psychiatric: Yes: Alert, Oriented


Labs: 


 CBC, BMP





 01/28/18 23:35 





 01/31/18 05:35 





 INR, PTT











INR  1.19  (0.82-1.09)  H  01/30/18  09:55    














Problem List





- Problems


(1) Intractable low back pain


Assessment/Plan: 


-Pain management


-Neurology consult appreciated


-rehab to [facility]


-symptoms improved with gabapentin, increased dose


-PT


-TLSO brace ordered, but did not tolerate


-tolerating ab binder


Code(s): M54.5 - LOW BACK PAIN   





(2) Compression fracture of L4 lumbar vertebra


Assessment/Plan: 


MRI lumbar spine reviewed


-see by Neurology


-Physical therapy


-gabapentin


Code(s): S32.040A - WEDGE COMPRESSION FRACTURE OF FOURTH LUMBAR VERTEBRA, INIT 

  





(3) Sinus arrhythmia


Assessment/Plan: 


-seen by cardiology


-no other cardiac intervention recommended


Code(s): I49.9 - CARDIAC ARRHYTHMIA, UNSPECIFIED   





(4) Atelectasis of right lung


Assessment/Plan: 


-incentive spirometer


seen by ID


Code(s): J98.11 - ATELECTASIS   





(5) Influenza A


Assessment/Plan: 


Hospital acquired


-tamiflu


-awaiting completion of 5 day course


Code(s): J10.1 - FLU DUE TO OTH IDENT INFLUENZA VIRUS W OTH RESP MANIFEST   





Assessment/Plan





see problem list

## 2018-01-31 NOTE — PN
Progress Note, Physician


History of Present Illness: 





Pt seen and examined at bedside. She is awake and alert. She complains of 

decrease PO intake. 





- Current Medication List


Current Medications: 


Active Medications





Acetaminophen (Tylenol -)  650 mg PO Q4H PRN


   PRN Reason: PAIN LEVEL 1 - 3


   Last Admin: 01/29/18 14:59 Dose:  650 mg


Calcitonin (Miacalcin Spray -)  200 units NS DAILY [facility]


   Last Admin: 01/31/18 10:24 Dose:  200 units


Calcium Carbonate/Cholecalciferol (Os-Vito 500+D -)  1 tab PO DAILY [facility]


   Last Admin: 01/31/18 10:22 Dose:  1 tab


Gabapentin (Neurontin -)  300 mg PO QID [facility]


   Last Admin: 01/31/18 10:22 Dose:  300 mg


Guaifenesin/Codeine Phosphate (Robitussin Ac -)  5 ml PO TID PRN


   PRN Reason: COUGH


   Last Admin: 01/30/18 17:22 Dose:  5 ml


Sodium Chloride (Normal Saline -)  1,000 mls @ 75 mls/hr IV ASDIR [facility]


   Last Admin: 01/31/18 06:33 Dose:  75 mls/hr


Lidocaine (Lidoderm Patch -)  1 patch TP DAILY [facility]


   Last Admin: 01/31/18 10:22 Dose:  1 patch


Miscellaneous (Lidoderm Patch Removal)  1 each MC DAILY@2200 [facility]


   Last Admin: 01/30/18 21:55 Dose:  Not Given


Ondansetron HCl (Zofran Injection)  4 mg IVPUSH Q6H PRN


   PRN Reason: NAUSEA AND/OR VOMITING


   Last Admin: 01/29/18 21:45 Dose:  4 mg


Oseltamivir Phosphate (Tamiflu -)  75 mg PO BID [facility]


   Stop: 02/03/18 14:14


   Last Admin: 01/31/18 10:22 Dose:  75 mg


Polyethylene Glycol (Miralax (For Daily Use) -)  17 gm PO DAILY PRN


   PRN Reason: CONSTIPATION


   Last Admin: 01/31/18 10:24 Dose:  17 grams


Propranolol HCl (Inderal -)  20 mg PO BID [facility]


   Last Admin: 01/31/18 10:22 Dose:  20 mg


Tramadol HCl (Ultram -)  50 mg PO Q6H PRN


   PRN Reason: PAIN LEVEL 1 - 3


   Last Admin: 01/31/18 10:22 Dose:  50 mg











- Objective


Vital Signs: 


 Vital Signs











Temperature  98.0 F   01/31/18 10:00


 


Pulse Rate  81   01/31/18 10:00


 


Respiratory Rate  20   01/31/18 10:00


 


Blood Pressure  104/53   01/31/18 10:00


 


O2 Sat by Pulse Oximetry (%)  93 L  01/31/18 09:00











Constitutional: Yes: Calm


HENT: Yes: Atraumatic


Neck: Yes: Supple


Cardiovascular: Yes: S1, S2


Respiratory: Yes: CTA Bilaterally


Gastrointestinal: Yes: Soft


Genitourinary: Yes: WNL


Musculoskeletal: Yes: WNL


Edema: No


Neurological: Yes: Oriented


Psychiatric: Yes: Oriented


Labs: 


 CBC, BMP





 01/28/18 23:35 





 01/31/18 05:35 





 INR, PTT











INR  1.19  (0.82-1.09)  H  01/30/18  09:55    














Problem List





- Problems


(1) Hyponatremia


Code(s): E87.1 - HYPO-OSMOLALITY AND HYPONATREMIA   





(2) Atelectasis of right lung


Code(s): J98.11 - ATELECTASIS   





(3) Back pain


Code(s): M54.9 - DORSALGIA, UNSPECIFIED   


Qualifiers: 


   Back pain location: low back pain   Chronicity: chronic   Back pain 

laterality: bilateral   Sciatica presence: without sciatica   Qualified Code(s)

: M54.5 - Low back pain; G89.29 - Other chronic pain; G89.29 - Other chronic 

pain   





(4) HTN (hypertension)


Code(s): I10 - ESSENTIAL (PRIMARY) HYPERTENSION   





Assessment/Plan


 Current Medications











Generic Name Dose Route Start Last Admin





  Trade Name Georgeq  PRN Reason Stop Dose Admin


 


Acetaminophen  650 mg  01/24/18 15:07  01/29/18 14:59





  Tylenol -  PO   650 mg





  Q4H PRN   Administration





  PAIN LEVEL 1 - 3   


 


Calcitonin  200 units  01/20/18 10:00  01/31/18 10:24





  Miacalcin Spray -  NS   200 units





  DAILY [facility]I   Administration


 


Calcium Carbonate/Cholecalciferol  1 tab  01/20/18 10:00  01/31/18 10:22





  Os-Vito 500+D -  PO   1 tab





  DAILY [facility]I   Administration


 


Gabapentin  300 mg  01/25/18 10:00  01/31/18 10:22





  Neurontin -  PO   300 mg





  QID [facility]   Administration


 


Guaifenesin/Codeine Phosphate  5 ml  01/29/18 14:10  01/30/18 17:22





  Robitussin Ac -  PO   5 ml





  TID PRN   Administration





  COUGH   


 


Sodium Chloride  1,000 mls @ 75 mls/hr  01/30/18 12:00  01/31/18 06:33





  Normal Saline -  IV   75 mls/hr





  ASDIR [facility]I   Administration


 


Lidocaine  1 patch  01/20/18 11:00  01/31/18 10:22





  Lidoderm Patch -  TP   1 patch





  DAILY [facility]   Administration


 


Miscellaneous  1 each  01/20/18 22:00  01/30/18 21:55





  Lidoderm Patch Removal  MC   Not Given





  DAILY@2200 [facility]   


 


Ondansetron HCl  4 mg  01/20/18 09:09  01/29/18 21:45





  Zofran Injection  IVPUSH   4 mg





  Q6H PRN   Administration





  NAUSEA AND/OR VOMITING   


 


Oseltamivir Phosphate  75 mg  01/29/18 14:15  01/31/18 10:22





  Tamiflu -  PO  02/03/18 14:14  75 mg





  BID [facility]I   Administration


 


Polyethylene Glycol  17 gm  01/20/18 10:00  01/31/18 10:24





  Miralax (For Daily Use) -  PO   17 grams





  DAILY PRN   Administration





  CONSTIPATION   


 


Propranolol HCl  20 mg  01/29/18 22:00  01/31/18 10:22





  Inderal -  PO   20 mg





  BID TEMI   Administration


 


Tramadol HCl  50 mg  01/27/18 15:55  01/31/18 10:22





  Ultram -  PO   50 mg





  Q6H PRN   Administration





  PAIN LEVEL 1 - 3   








 Laboratory Tests











  01/31/18 01/31/18 01/31/18





  05:35 07:30 07:30


 


Serum Osmolality  Pending  


 


Urine Blood    Negative


 


Ur Random Sodium   26 











Impression


1. hyponatremia


2. fever


3. back pain


4. htn


5. hyperlipidemia


6. influenza





Plan


- cont with fluids


- sodium improving


- urine sodium is low


- repeat labs in am


- follow osms


- encourage PO intake


- will follow


Dr Mcfarlane

## 2018-02-01 RX ADMIN — CALCITONIN SALMON SCH UNITS: 200 SPRAY, METERED NASAL at 10:54

## 2018-02-01 RX ADMIN — Medication SCH: at 22:13

## 2018-02-01 RX ADMIN — SODIUM CHLORIDE SCH MLS/HR: 9 INJECTION, SOLUTION INTRAVENOUS at 12:46

## 2018-02-01 RX ADMIN — OSELTAMIVIR PHOSPHATE SCH MG: 75 CAPSULE ORAL at 22:12

## 2018-02-01 RX ADMIN — ZOLPIDEM TARTRATE PRN MG: 5 TABLET, COATED ORAL at 00:15

## 2018-02-01 RX ADMIN — ACETAMINOPHEN PRN MG: 325 TABLET ORAL at 17:40

## 2018-02-01 RX ADMIN — GABAPENTIN SCH MG: 300 CAPSULE ORAL at 10:31

## 2018-02-01 RX ADMIN — GABAPENTIN SCH MG: 300 CAPSULE ORAL at 13:21

## 2018-02-01 RX ADMIN — Medication SCH TAB: at 10:31

## 2018-02-01 RX ADMIN — GABAPENTIN SCH MG: 300 CAPSULE ORAL at 22:12

## 2018-02-01 RX ADMIN — GABAPENTIN SCH MG: 300 CAPSULE ORAL at 17:40

## 2018-02-01 RX ADMIN — GUAIFENESIN AND CODEINE PHOSPHATE PRN ML: 10; 100 LIQUID ORAL at 17:40

## 2018-02-01 RX ADMIN — OSELTAMIVIR PHOSPHATE SCH MG: 75 CAPSULE ORAL at 10:31

## 2018-02-01 RX ADMIN — LIDOCAINE SCH PATCH: 50 PATCH TOPICAL at 10:31

## 2018-02-01 NOTE — PN
Progress Note, Physician


History of Present Illness: 





Pt seen and examined at bedside. She says that her appetite is improved today. 





- Current Medication List


Current Medications: 


Active Medications





Acetaminophen (Tylenol -)  650 mg PO Q4H PRN


   PRN Reason: PAIN LEVEL 1 - 3


   Last Admin: 01/31/18 13:58 Dose:  650 mg


Calcitonin (Miacalcin Spray -)  200 units NS DAILY [facility]


   Last Admin: 02/01/18 10:54 Dose:  200 units


Calcium Carbonate/Cholecalciferol (Os-Vito 500+D -)  1 tab PO DAILY [facility]


   Last Admin: 02/01/18 10:31 Dose:  1 tab


Gabapentin (Neurontin -)  300 mg PO QID [facility]


   Last Admin: 02/01/18 10:31 Dose:  300 mg


Guaifenesin/Codeine Phosphate (Robitussin Ac -)  5 ml PO TID PRN


   PRN Reason: COUGH


   Last Admin: 01/30/18 17:22 Dose:  5 ml


Sodium Chloride (Normal Saline -)  1,000 mls @ 75 mls/hr IV ASDIR [facility]


   Last Admin: 01/31/18 23:14 Dose:  75 mls/hr


Lidocaine (Lidoderm Patch -)  1 patch TP DAILY [facility]


   Last Admin: 02/01/18 10:31 Dose:  1 patch


Miscellaneous (Lidoderm Patch Removal)  1 each MC DAILY@2200 [facility]


   Last Admin: 01/31/18 23:09 Dose:  1 each


Ondansetron HCl (Zofran Injection)  4 mg IVPUSH Q6H PRN


   PRN Reason: NAUSEA AND/OR VOMITING


   Last Admin: 01/31/18 13:58 Dose:  4 mg


Oseltamivir Phosphate (Tamiflu -)  75 mg PO BID [facility]


   Stop: 02/03/18 14:14


   Last Admin: 02/01/18 10:31 Dose:  75 mg


Polyethylene Glycol (Miralax (For Daily Use) -)  17 gm PO DAILY [facility]


   PRN Reason: CONSTIPATION


   Last Admin: 01/31/18 10:24 Dose:  17 grams


Propranolol HCl (Inderal -)  20 mg PO BID [facility]


   Last Admin: 02/01/18 10:30 Dose:  20 mg


Zolpidem Tartrate (Ambien -)  5 mg PO HS PRN


   PRN Reason: INSOMNIA


   Last Admin: 02/01/18 00:15 Dose:  5 mg











- Objective


Vital Signs: 


 Vital Signs











Temperature  98.1 F   02/01/18 08:10


 


Pulse Rate  44 L  02/01/18 08:10


 


Respiratory Rate  20   02/01/18 08:10


 


Blood Pressure  118/57   02/01/18 08:10


 


O2 Sat by Pulse Oximetry (%)  94 L  02/01/18 09:00











Constitutional: Yes: Calm


Eyes: Yes: Conjunctiva Clear


HENT: Yes: Atraumatic


Neck: Yes: Supple


Cardiovascular: Yes: S1, S2


Respiratory: Yes: CTA Bilaterally


Gastrointestinal: Yes: Soft


Genitourinary: Yes: WNL


Musculoskeletal: Yes: Back Pain


Edema: No


Neurological: Yes: Oriented


Psychiatric: Yes: Oriented


Labs: 


 CBC, BMP





 01/28/18 23:35 





 01/31/18 05:35 





 INR, PTT











INR  1.19  (0.82-1.09)  H  01/30/18  09:55    














Problem List





- Problems


(1) Hyponatremia


Code(s): E87.1 - HYPO-OSMOLALITY AND HYPONATREMIA   





(2) Atelectasis of right lung


Code(s): J98.11 - ATELECTASIS   





(3) Back pain


Code(s): M54.9 - DORSALGIA, UNSPECIFIED   


Qualifiers: 


   Back pain location: low back pain   Chronicity: chronic   Back pain 

laterality: bilateral   Sciatica presence: without sciatica   Qualified Code(s)

: M54.5 - Low back pain; G89.29 - Other chronic pain; G89.29 - Other chronic 

pain   





(4) HTN (hypertension)


Code(s): I10 - ESSENTIAL (PRIMARY) HYPERTENSION   





Assessment/Plan


 Current Medications











Generic Name Dose Route Start Last Admin





  Trade Name Georgeq  PRN Reason Stop Dose Admin


 


Acetaminophen  650 mg  01/24/18 15:07  01/31/18 13:58





  Tylenol -  PO   650 mg





  Q4H PRN   Administration





  PAIN LEVEL 1 - 3   


 


Calcitonin  200 units  01/20/18 10:00  02/01/18 10:54





  Miacalcin Spray -  NS   200 units





  DAILY [facility]I   Administration


 


Calcium Carbonate/Cholecalciferol  1 tab  01/20/18 10:00  02/01/18 10:31





  Os-Vito 500+D -  PO   1 tab





  DAILY [facility]I   Administration


 


Gabapentin  300 mg  01/25/18 10:00  02/01/18 10:31





  Neurontin -  PO   300 mg





  QID [facility]I   Administration


 


Guaifenesin/Codeine Phosphate  5 ml  01/29/18 14:10  01/30/18 17:22





  Robitussin Ac -  PO   5 ml





  TID PRN   Administration





  COUGH   


 


Sodium Chloride  1,000 mls @ 75 mls/hr  01/30/18 12:00  01/31/18 23:14





  Normal Saline -  IV   75 mls/hr





  ASDIR [facility]I   Administration


 


Lidocaine  1 patch  01/20/18 11:00  02/01/18 10:31





  Lidoderm Patch -  TP   1 patch





  DAILY [facility]   Administration


 


Miscellaneous  1 each  01/20/18 22:00  01/31/18 23:09





  Lidoderm Patch Removal  MC   1 each





  DAILY@2200 [facility]   Administration


 


Ondansetron HCl  4 mg  01/20/18 09:09  01/31/18 13:58





  Zofran Injection  IVPUSH   4 mg





  Q6H PRN   Administration





  NAUSEA AND/OR VOMITING   


 


Oseltamivir Phosphate  75 mg  01/29/18 14:15  02/01/18 10:31





  Tamiflu -  PO  02/03/18 14:14  75 mg





  BID [facility]   Administration


 


Polyethylene Glycol  17 gm  01/20/18 10:00  01/31/18 10:24





  Miralax (For Daily Use) -  PO   17 grams





  DAILY PRN   Administration





  CONSTIPATION   


 


Propranolol HCl  20 mg  01/29/18 22:00  02/01/18 10:30





  Inderal -  PO   20 mg





  BID [facility]   Administration


 


Zolpidem Tartrate  5 mg  01/31/18 22:00  02/01/18 00:15





  Ambien -  PO   5 mg





  HS PRN   Administration





  INSOMNIA   











Impression


1. hyponatremia


2. fever


3. back pain


4. htn


5. hyperlipidemia


6. influenza





Plan


- decrease rate of fluids


- repeat lab 


- encourage PO intake


- urine osm is not back yet


- will follow


Dr Mcfarlane

## 2018-02-01 NOTE — PN
Progress Note, Physician


Chief Complaint: 





Intractable back pain


History of Present Illness: 





NAD, tired today


awaiting completion of 7 days of isolation before discharge to rehab





- Current Medication List


Current Medications: 


Active Medications





Acetaminophen (Tylenol -)  650 mg PO Q4H PRN


   PRN Reason: PAIN LEVEL 1 - 3


   Last Admin: 01/31/18 13:58 Dose:  650 mg


Calcitonin (Miacalcin Spray -)  200 units NS DAILY [facility]


   Last Admin: 01/31/18 10:24 Dose:  200 units


Calcium Carbonate/Cholecalciferol (Os-Vito 500+D -)  1 tab PO DAILY [facility]


   Last Admin: 01/31/18 10:22 Dose:  1 tab


Gabapentin (Neurontin -)  300 mg PO QID [facility]


   Last Admin: 01/31/18 23:09 Dose:  300 mg


Guaifenesin/Codeine Phosphate (Robitussin Ac -)  5 ml PO TID PRN


   PRN Reason: COUGH


   Last Admin: 01/30/18 17:22 Dose:  5 ml


Sodium Chloride (Normal Saline -)  1,000 mls @ 75 mls/hr IV ASDIR [facility]


   Last Admin: 01/31/18 23:14 Dose:  75 mls/hr


Lidocaine (Lidoderm Patch -)  1 patch TP DAILY [facility]


   Last Admin: 01/31/18 10:22 Dose:  1 patch


Miscellaneous (Lidoderm Patch Removal)  1 each MC DAILY@2200 [facility]


   Last Admin: 01/31/18 23:09 Dose:  1 each


Ondansetron HCl (Zofran Injection)  4 mg IVPUSH Q6H PRN


   PRN Reason: NAUSEA AND/OR VOMITING


   Last Admin: 01/31/18 13:58 Dose:  4 mg


Oseltamivir Phosphate (Tamiflu -)  75 mg PO BID [facility]


   Stop: 02/03/18 14:14


   Last Admin: 01/31/18 23:09 Dose:  75 mg


Polyethylene Glycol (Miralax (For Daily Use) -)  17 gm PO DAILY PRN


   PRN Reason: CONSTIPATION


   Last Admin: 01/31/18 10:24 Dose:  17 grams


Propranolol HCl (Inderal -)  20 mg PO BID [facility]


   Last Admin: 01/31/18 23:08 Dose:  20 mg


Zolpidem Tartrate (Ambien -)  5 mg PO HS PRN


   PRN Reason: INSOMNIA


   Last Admin: 02/01/18 00:15 Dose:  5 mg











- Objective


Vital Signs: 


 Vital Signs











Temperature  97.9 F   02/01/18 07:12


 


Pulse Rate  72   02/01/18 07:12


 


Respiratory Rate  20   02/01/18 07:12


 


Blood Pressure  126/69   02/01/18 07:12


 


O2 Sat by Pulse Oximetry (%)  93 L  01/31/18 21:00











Constitutional: Yes: Well Nourished, No Distress, Calm


Cardiovascular: Yes: Regular Rate and Rhythm


Respiratory: Yes: Regular


Gastrointestinal: Yes: Normal Bowel Sounds, Soft


Musculoskeletal: Yes: WNL


Extremities: Yes: WNL


Edema: No


Peripheral Pulses WNL: Yes


Neurological: Yes: Alert, Oriented


Psychiatric: Yes: Alert, Oriented


Labs: 


 CBC, BMP





 01/28/18 23:35 





 01/31/18 05:35 





 INR, PTT











INR  1.19  (0.82-1.09)  H  01/30/18  09:55    














Problem List





- Problems


(1) Intractable low back pain


Assessment/Plan: 


-Pain management


-Neurology consult appreciated


-rehab to [facility]


-symptoms improved with gabapentin, increased dose


-PT


-TLSO brace ordered, but did not tolerate


-tolerating ab binder


Code(s): M54.5 - LOW BACK PAIN   





(2) Compression fracture of L4 lumbar vertebra


Assessment/Plan: 


MRI lumbar spine reviewed


-see by Neurology


-Physical therapy


-gabapentin


Code(s): S32.040A - WEDGE COMPRESSION FRACTURE OF FOURTH LUMBAR VERTEBRA, INIT 

  





(3) Sinus arrhythmia


Assessment/Plan: 


-seen by cardiology


-no other cardiac intervention recommended


Code(s): I49.9 - CARDIAC ARRHYTHMIA, UNSPECIFIED   





(4) Atelectasis of right lung


Assessment/Plan: 


-incentive spirometer


seen by ID


Code(s): J98.11 - ATELECTASIS   





(5) Influenza A


Assessment/Plan: 


Hospital acquired


-tamiflu


-awaiting completion of 5 day course


Code(s): J10.1 - FLU DUE TO OTH IDENT INFLUENZA VIRUS W OTH RESP MANIFEST   





Assessment/Plan





see problem list

## 2018-02-01 NOTE — PN
Progress Note (short form)





- Note


Progress Note: 


Patient seen at bedside and evaluated. She is doing well this morning. Patient 

still with severe lower back pain which is worst  with position changes. She is 

recovering from the flu and awaiting clearance for rehab once completion of 7 

days of isolation.





Previously discussed with Dr. Scherer, plan for vertebroplasty once off 

aspirin for 5 days. Continue with usage of TLSO or abdominal binder if TLSO is 

too cumbersome. Will continue to follow.

## 2018-02-02 LAB
ANION GAP SERPL CALC-SCNC: 7 MMOL/L (ref 8–16)
BUN SERPL-MCNC: 9 MG/DL (ref 7–18)
CALCIUM SERPL-MCNC: 7.6 MG/DL (ref 8.5–10.1)
CHLORIDE SERPL-SCNC: 103 MMOL/L (ref 98–107)
CO2 SERPL-SCNC: 25 MMOL/L (ref 21–32)
CREAT SERPL-MCNC: 0.4 MG/DL (ref 0.55–1.02)
GLUCOSE SERPL-MCNC: 88 MG/DL (ref 74–106)
POTASSIUM SERPLBLD-SCNC: 3.7 MMOL/L (ref 3.5–5.1)
SODIUM SERPL-SCNC: 135 MMOL/L (ref 136–145)

## 2018-02-02 RX ADMIN — SODIUM CHLORIDE SCH MLS/HR: 9 INJECTION, SOLUTION INTRAVENOUS at 13:52

## 2018-02-02 RX ADMIN — GABAPENTIN SCH MG: 300 CAPSULE ORAL at 10:16

## 2018-02-02 RX ADMIN — Medication SCH: at 21:43

## 2018-02-02 RX ADMIN — LIDOCAINE SCH PATCH: 50 PATCH TOPICAL at 10:16

## 2018-02-02 RX ADMIN — ACETAMINOPHEN PRN MG: 325 TABLET ORAL at 15:30

## 2018-02-02 RX ADMIN — GABAPENTIN SCH MG: 300 CAPSULE ORAL at 18:15

## 2018-02-02 RX ADMIN — GABAPENTIN SCH MG: 300 CAPSULE ORAL at 15:30

## 2018-02-02 RX ADMIN — ACETAMINOPHEN PRN MG: 325 TABLET ORAL at 20:07

## 2018-02-02 RX ADMIN — ACETAMINOPHEN PRN MG: 325 TABLET ORAL at 01:17

## 2018-02-02 RX ADMIN — CALCITONIN SALMON SCH UNITS: 200 SPRAY, METERED NASAL at 10:16

## 2018-02-02 RX ADMIN — OSELTAMIVIR PHOSPHATE SCH MG: 75 CAPSULE ORAL at 10:16

## 2018-02-02 RX ADMIN — ZOLPIDEM TARTRATE PRN MG: 5 TABLET, COATED ORAL at 01:17

## 2018-02-02 RX ADMIN — Medication SCH TAB: at 10:16

## 2018-02-02 RX ADMIN — OSELTAMIVIR PHOSPHATE SCH MG: 75 CAPSULE ORAL at 21:43

## 2018-02-02 RX ADMIN — GABAPENTIN SCH MG: 300 CAPSULE ORAL at 21:43

## 2018-02-02 RX ADMIN — GUAIFENESIN AND CODEINE PHOSPHATE PRN ML: 10; 100 LIQUID ORAL at 10:31

## 2018-02-02 NOTE — PN
Progress Note, Physician


Chief Complaint: 





Intractable back pain


History of Present Illness: 





NAD, appetite is improved


Serum osm low


seen by nephrology


awaiting completion of 7 days of isolation before discharge to rehab





- Current Medication List


Current Medications: 


Active Medications





Acetaminophen (Tylenol -)  650 mg PO Q4H PRN


   PRN Reason: PAIN LEVEL 1 - 3


   Last Admin: 02/02/18 01:17 Dose:  650 mg


Calcitonin (Miacalcin Spray -)  200 units NS DAILY [facility]


   Last Admin: 02/01/18 10:54 Dose:  200 units


Calcium Carbonate/Cholecalciferol (Os-Vito 500+D -)  1 tab PO DAILY [facility]


   Last Admin: 02/01/18 10:31 Dose:  1 tab


Gabapentin (Neurontin -)  300 mg PO QID [facility]


   Last Admin: 02/01/18 22:12 Dose:  300 mg


Guaifenesin/Codeine Phosphate (Robitussin Ac -)  5 ml PO TID PRN


   PRN Reason: COUGH


   Last Admin: 02/01/18 17:40 Dose:  5 ml


Sodium Chloride (Normal Saline -)  1,000 mls @ 40 mls/hr IV ASDIR [facility]


   Last Admin: 02/01/18 12:46 Dose:  40 mls/hr


Lidocaine (Lidoderm Patch -)  1 patch TP DAILY [facility]


   Last Admin: 02/01/18 10:31 Dose:  1 patch


Miscellaneous (Lidoderm Patch Removal)  1 each MC DAILY@2200 [facility]


   Last Admin: 02/01/18 22:13 Dose:  Not Given


Ondansetron HCl (Zofran Injection)  4 mg IVPUSH Q6H PRN


   PRN Reason: NAUSEA AND/OR VOMITING


   Last Admin: 01/31/18 13:58 Dose:  4 mg


Oseltamivir Phosphate (Tamiflu -)  75 mg PO BID [facility]


   Stop: 02/03/18 14:14


   Last Admin: 02/01/18 22:12 Dose:  75 mg


Polyethylene Glycol (Miralax (For Daily Use) -)  17 gm PO DAILY [facility]


   PRN Reason: CONSTIPATION


   Last Admin: 01/31/18 10:24 Dose:  17 grams


Propranolol HCl (Inderal -)  20 mg PO BID [facility]


   Last Admin: 02/01/18 22:12 Dose:  20 mg


Zolpidem Tartrate (Ambien -)  5 mg PO HS PRN


   PRN Reason: INSOMNIA


   Last Admin: 02/02/18 01:17 Dose:  5 mg











- Objective


Vital Signs: 


 Vital Signs











Temperature  98.1 F   02/02/18 07:24


 


Pulse Rate  69   02/02/18 07:24


 


Respiratory Rate  18   02/02/18 07:24


 


Blood Pressure  145/83   02/02/18 07:24


 


O2 Sat by Pulse Oximetry (%)  94 L  02/01/18 21:00











Constitutional: Yes: Well Nourished


Cardiovascular: Yes: WNL, Regular Rate and Rhythm


Respiratory: Yes: WNL, Regular, CTA Bilaterally


Gastrointestinal: Yes: WNL, Normal Bowel Sounds


Musculoskeletal: Yes: WNL


Extremities: Yes: WNL


Edema: No


Peripheral Pulses WNL: Yes


Integumentary: Yes: WNL


Neurological: Yes: WNL, Alert, Oriented


...Motor Strength: WNL


Psychiatric: Yes: WNL


Labs: 


 CBC, BMP





 01/28/18 23:35 





 INR, PTT











INR  1.19  (0.82-1.09)  H  01/30/18  09:55    














Problem List





- Problems


(1) Intractable low back pain


Assessment/Plan: 


-Pain management


-Neurology consult appreciated


-rehab to [facility]


-symptoms improved with gabapentin, increased dose


-PT


-TLSO brace ordered, but did not tolerate


-tolerating ab binder


Code(s): M54.5 - LOW BACK PAIN   





(2) Compression fracture of L4 lumbar vertebra


Assessment/Plan: 


MRI lumbar spine reviewed


-see by Neurology


-Physical therapy


-gabapentin


Code(s): S32.040A - WEDGE COMPRESSION FRACTURE OF FOURTH LUMBAR VERTEBRA, INIT 

  





(3) Sinus arrhythmia


Assessment/Plan: 


-seen by cardiology


-no other cardiac intervention recommended


Code(s): I49.9 - CARDIAC ARRHYTHMIA, UNSPECIFIED   





(4) Atelectasis of right lung


Assessment/Plan: 


-incentive spirometer


seen by ID


Code(s): J98.11 - ATELECTASIS   





(5) Influenza A


Assessment/Plan: 


Hospital acquired


-tamiflu


-awaiting completion of 5 day course


Code(s): J10.1 - FLU DUE TO OTH IDENT INFLUENZA VIRUS W OTH RESP MANIFEST   





(6) Hyponatremia


Assessment/Plan: 


low serum osm


-seen by nephrology


Code(s): E87.1 - HYPO-OSMOLALITY AND HYPONATREMIA   





Assessment/Plan





see problem list

## 2018-02-02 NOTE — PN
Progress Note, Physician


History of Present Illness: 





Pt seen and examined at bedside. She complains of poor appetite today. 





- Current Medication List


Current Medications: 


Active Medications





Acetaminophen (Tylenol -)  650 mg PO Q4H PRN


   PRN Reason: PAIN LEVEL 1 - 3


   Last Admin: 02/02/18 15:30 Dose:  650 mg


Calcitonin (Miacalcin Spray -)  200 units NS DAILY [facility]


   Last Admin: 02/02/18 10:16 Dose:  200 units


Calcium Carbonate/Cholecalciferol (Os-Vito 500+D -)  1 tab PO DAILY [facility]


   Last Admin: 02/02/18 10:16 Dose:  1 tab


Gabapentin (Neurontin -)  300 mg PO QID [facility]


   Last Admin: 02/02/18 15:30 Dose:  300 mg


Guaifenesin/Codeine Phosphate (Robitussin Ac -)  5 ml PO TID PRN


   PRN Reason: COUGH


   Last Admin: 02/02/18 10:31 Dose:  5 ml


Sodium Chloride (Normal Saline -)  1,000 mls @ 40 mls/hr IV ASDIR [facility]


   Last Admin: 02/02/18 13:52 Dose:  40 mls/hr


Lidocaine (Lidoderm Patch -)  1 patch TP DAILY [facility]


   Last Admin: 02/02/18 10:16 Dose:  1 patch


Miscellaneous (Lidoderm Patch Removal)  1 each MC DAILY@2200 [facility]


   Last Admin: 02/01/18 22:13 Dose:  Not Given


Ondansetron HCl (Zofran Injection)  4 mg IVPUSH Q6H PRN


   PRN Reason: NAUSEA AND/OR VOMITING


   Last Admin: 01/31/18 13:58 Dose:  4 mg


Oseltamivir Phosphate (Tamiflu -)  75 mg PO BID [facility]


   Stop: 02/03/18 14:14


   Last Admin: 02/02/18 10:16 Dose:  75 mg


Polyethylene Glycol (Miralax (For Daily Use) -)  17 gm PO DAILY [facility]N


   PRN Reason: CONSTIPATION


   Last Admin: 01/31/18 10:24 Dose:  17 grams


Propranolol HCl (Inderal -)  20 mg PO BID [facility]


   Last Admin: 02/02/18 10:15 Dose:  20 mg


Zolpidem Tartrate (Ambien -)  5 mg PO HS PRN


   PRN Reason: INSOMNIA


   Last Admin: 02/02/18 01:17 Dose:  5 mg











- Objective


Vital Signs: 


 Vital Signs











Temperature  98.1 F   02/02/18 17:44


 


Pulse Rate  72   02/02/18 17:44


 


Respiratory Rate  20   02/02/18 17:44


 


Blood Pressure  120/85   02/02/18 17:44


 


O2 Sat by Pulse Oximetry (%)  98   02/02/18 09:00











Constitutional: Yes: Calm


Eyes: Yes: Conjunctiva Clear


HENT: Yes: Atraumatic


Cardiovascular: Yes: S1, S2


Respiratory: Yes: CTA Bilaterally


Gastrointestinal: Yes: Soft


Genitourinary: Yes: WNL


Musculoskeletal: Yes: Back Pain


Edema: No


Neurological: Yes: Oriented


Psychiatric: Yes: Oriented


Labs: 


 CBC, BMP





 01/28/18 23:35 





 02/02/18 06:00 





 INR, PTT











INR  1.19  (0.82-1.09)  H  01/30/18  09:55    














Problem List





- Problems


(1) Hyponatremia


Code(s): E87.1 - HYPO-OSMOLALITY AND HYPONATREMIA   





(2) Atelectasis of right lung


Code(s): J98.11 - ATELECTASIS   





(3) Back pain


Code(s): M54.9 - DORSALGIA, UNSPECIFIED   


Qualifiers: 


   Back pain location: low back pain   Chronicity: chronic   Back pain 

laterality: bilateral   Sciatica presence: without sciatica   Qualified Code(s)

: M54.5 - Low back pain; G89.29 - Other chronic pain; G89.29 - Other chronic 

pain   





(4) HTN (hypertension)


Code(s): I10 - ESSENTIAL (PRIMARY) HYPERTENSION   





Assessment/Plan


 Current Medications











Generic Name Dose Route Start Last Admin





  Trade Name Freq  PRN Reason Stop Dose Admin


 


Acetaminophen  650 mg  01/24/18 15:07  02/02/18 15:30





  Tylenol -  PO   650 mg





  Q4H PRN   Administration





  PAIN LEVEL 1 - 3   


 


Calcitonin  200 units  01/20/18 10:00  02/02/18 10:16





  Miacalcin Spray -  NS   200 units





  DAILY [facility]   Administration


 


Calcium Carbonate/Cholecalciferol  1 tab  01/20/18 10:00  02/02/18 10:16





  Os-Vito 500+D -  PO   1 tab





  DAILY [facility]   Administration


 


Gabapentin  300 mg  01/25/18 10:00  02/02/18 15:30





  Neurontin -  PO   300 mg





  QID [facility]   Administration


 


Guaifenesin/Codeine Phosphate  5 ml  01/29/18 14:10  02/02/18 10:31





  Robitussin Ac -  PO   5 ml





  TID PRN   Administration





  COUGH   


 


Sodium Chloride  1,000 mls @ 40 mls/hr  02/01/18 11:52  02/02/18 13:52





  Normal Saline -  IV   40 mls/hr





  ASDIR [facility]   Administration


 


Lidocaine  1 patch  01/20/18 11:00  02/02/18 10:16





  Lidoderm Patch -  TP   1 patch





  DAILY [facility]   Administration


 


Miscellaneous  1 each  01/20/18 22:00  02/01/18 22:13





  Lidoderm Patch Removal  MC   Not Given





  DAILY@2200 [facility]   


 


Ondansetron HCl  4 mg  01/20/18 09:09  01/31/18 13:58





  Zofran Injection  IVPUSH   4 mg





  Q6H PRN   Administration





  NAUSEA AND/OR VOMITING   


 


Oseltamivir Phosphate  75 mg  01/29/18 14:15  02/02/18 10:16





  Tamiflu -  PO  02/03/18 14:14  75 mg





  BID [facility]   Administration


 


Polyethylene Glycol  17 gm  01/20/18 10:00  01/31/18 10:24





  Miralax (For Daily Use) -  PO   17 grams





  DAILY PRN   Administration





  CONSTIPATION   


 


Propranolol HCl  20 mg  01/29/18 22:00  02/02/18 10:15





  Inderal -  PO   20 mg





  BID [facility]   Administration


 


Zolpidem Tartrate  5 mg  01/31/18 22:00  02/02/18 01:17





  Ambien -  PO   5 mg





  HS PRN   Administration





  INSOMNIA   











Impression


1. hyponatremia


2. fever


3. back pain


4. htn


5. hyperlipidemia


6. influenza





Plan


- cont saline as sodium is improving


- repeat labs in am


- encourage PO intake


- urine osm is not back yet


- will follow


Dr Mcfarlane

## 2018-02-03 RX ADMIN — Medication SCH ML: at 17:28

## 2018-02-03 RX ADMIN — CALCITONIN SALMON SCH UNITS: 200 SPRAY, METERED NASAL at 11:07

## 2018-02-03 RX ADMIN — Medication SCH: at 22:13

## 2018-02-03 RX ADMIN — ACETAMINOPHEN PRN MG: 325 TABLET ORAL at 06:59

## 2018-02-03 RX ADMIN — GABAPENTIN SCH MG: 300 CAPSULE ORAL at 11:04

## 2018-02-03 RX ADMIN — OSELTAMIVIR PHOSPHATE SCH MG: 75 CAPSULE ORAL at 11:05

## 2018-02-03 RX ADMIN — ACETAMINOPHEN PRN MG: 325 TABLET ORAL at 02:58

## 2018-02-03 RX ADMIN — LIDOCAINE SCH PATCH: 50 PATCH TOPICAL at 11:06

## 2018-02-03 RX ADMIN — GABAPENTIN SCH MG: 300 CAPSULE ORAL at 17:29

## 2018-02-03 RX ADMIN — SODIUM CHLORIDE SCH: 9 INJECTION, SOLUTION INTRAVENOUS at 13:01

## 2018-02-03 RX ADMIN — GABAPENTIN SCH MG: 300 CAPSULE ORAL at 13:13

## 2018-02-03 RX ADMIN — GABAPENTIN SCH MG: 300 CAPSULE ORAL at 22:13

## 2018-02-03 RX ADMIN — Medication SCH TAB: at 11:04

## 2018-02-03 NOTE — PN
Progress Note, Physician


Chief Complaint: 





awake alert


c/o pain


this is my first encounter with this patient on this admission





- Current Medication List


Current Medications: 


Active Medications





Acetaminophen (Tylenol -)  650 mg PO Q4H PRN


   PRN Reason: PAIN LEVEL 1 - 3


   Last Admin: 02/03/18 06:59 Dose:  650 mg


Calcitonin (Miacalcin Spray -)  [facility]


   Last Admin: 02/03/18 11:07 Dose:  200 units


Calcium Carbonate/Cholecalciferol (Os-Vito 500+D -)  1 tab PO DAILY [facility]


   Last Admin: 02/03/18 11:04 Dose:  1 tab


Gabapentin (Neurontin -)  300 mg PO QID [facility]


   Last Admin: 02/03/18 11:04 Dose:  300 mg


Guaifenesin/Codeine Phosphate (Robitussin Ac -)  5 ml PO TID PRN


   PRN Reason: COUGH


   Last Admin: 02/02/18 10:31 Dose:  5 ml


Sodium Chloride (Normal Saline -)  1,000 mls @ 40 mls/hr IV [facility]IR [facility]


   Last Admin: 02/02/18 13:52 Dose:  40 mls/hr


Zoledronic Acid 5 mg/ (Miscellaneous)  100 mls @ 100 mls/hr IV ONCE ONE


   Stop: 02/03/18 13:13


Lidocaine (Lidoderm Patch -)  1 patch TP DAILY [facility]


   Last Admin: 02/03/18 11:06 Dose:  1 patch


Miscellaneous (Lidoderm Patch Removal)  1 each MC DAILY@2200 [facility]


   Last Admin: 02/02/18 21:43 Dose:  Not Given


Ondansetron HCl (Zofran Injection)  4 mg IVPUSH Q6H PRN


   PRN Reason: NAUSEA AND/OR VOMITING


   Last Admin: 01/31/18 13:58 Dose:  4 mg


Oseltamivir Phosphate (Tamiflu -)  75 mg PO BID [facility]


   Stop: 02/03/18 14:14


   Last Admin: 02/03/18 11:05 Dose:  75 mg


Polyethylene Glycol (Miralax (For Daily Use) -)  17 gm PO DAILY [facility]


   PRN Reason: CONSTIPATION


   Last Admin: 01/31/18 10:24 Dose:  17 grams


Propranolol HCl (Inderal -)  20 mg PO BID [facility]


   Last Admin: 02/02/18 21:43 Dose:  20 mg


Zolpidem Tartrate (Ambien -)  5 mg PO HS PRN


   PRN Reason: INSOMNIA


   Last Admin: 02/02/18 01:17 Dose:  5 mg











- Objective


Vital Signs: 


 Vital Signs











Temperature  97.7 F   02/03/18 05:48


 


Pulse Rate  71   02/03/18 05:48


 


Respiratory Rate  20   02/03/18 05:48


 


Blood Pressure  143/87   02/03/18 05:48


 


O2 Sat by Pulse Oximetry (%)  95   02/02/18 21:00











Constitutional: Yes: Mild Distress


Eyes: Yes: WNL


HENT: Yes: WNL


Neck: Yes: WNL


Cardiovascular: Yes: WNL


Respiratory: Yes: WNL


Gastrointestinal: Yes: WNL


Genitourinary: Yes: WNL


Musculoskeletal: Yes: Back Pain, Muscle Weakness


Extremities: Yes: Other


Edema: No


Peripheral Pulses WNL: Yes


Integumentary: Yes: WNL


Wound/Incision: Yes: Clean/Dry


Neurological: Yes: Pre-Existing Deficit


...Motor Strength: LLE, RLE


Psychiatric: Yes: WNL


Labs: 


 CBC, BMP





 01/28/18 23:35 





 02/02/18 06:00 





 INR, PTT











INR  1.19  (0.82-1.09)  H  01/30/18  09:55    














Problem List





- Problems


(1) Osteoporosis


Code(s): M81.0 - AGE-RELATED OSTEOPOROSIS W/O CURRENT PATHOLOGICAL FRACTURE   


Qualifiers: 


   Osteoporosis type: age-related 





(2) Atelectasis of right lung


Code(s): J98.11 - ATELECTASIS   





(3) Back pain


Code(s): M54.9 - DORSALGIA, UNSPECIFIED   


Qualifiers: 


   Back pain location: low back pain   Chronicity: chronic   Back pain 

laterality: bilateral   Sciatica presence: without sciatica   Qualified Code(s)

: M54.5 - Low back pain; G89.29 - Other chronic pain; G89.29 - Other chronic 

pain   





(4) DVT prophylaxis


Code(s): [id number] -    





(5) Influenza A


Code(s): J10.1 - FLU DUE TO OTH IDENT INFLUENZA VIRUS W OTH RESP MANIFEST   





(6) Intractable low back pain


Code(s): M54.5 - LOW BACK PAIN   





Assessment/Plan





PAIN MEDS


TORADOL IV X 1


RECLAST IV X 1


RECEIVED 1 DOSE 1 YEAR AGO


CALCITONIN NS


VITAMIN D


DVT PROPHYLAXIS

## 2018-02-03 NOTE — PN
Progress Note, Physician


History of Present Illness: 





Pt seen and examined at bedside. She says she is not eating much. 





- Current Medication List


Current Medications: 


Active Medications





Acetaminophen (Tylenol -)  650 mg PO Q4H PRN


   PRN Reason: PAIN LEVEL 1 - 3


   Last Admin: 02/03/18 06:59 Dose:  650 mg


Amino Acids (Prosource No Carb Liquid Pkt)  30 ml PO BID@0800,1730 [facility]


Calcitonin (Miacalcin Spray -)  200 units NS DAILY [facility]


   Last Admin: 02/03/18 11:07 Dose:  200 units


Calcium Carbonate/Cholecalciferol (Os-Vito 500+D -)  1 tab PO DAILY [facility]


   Last Admin: 02/03/18 11:04 Dose:  1 tab


Gabapentin (Neurontin -)  300 mg PO QID [facility]


   Last Admin: 02/03/18 13:13 Dose:  300 mg


Guaifenesin/Codeine Phosphate (Robitussin Ac -)  5 ml PO TID PRN


   PRN Reason: COUGH


   Last Admin: 02/02/18 10:31 Dose:  5 ml


Sodium Chloride (Normal Saline -)  1,000 mls @ 40 mls/hr IV ASDIR [facility]


   Last Admin: 02/03/18 13:01 Dose:  Not Given


Lidocaine (Lidoderm Patch -)  1 patch TP DAILY [facility]


   Last Admin: 02/03/18 11:06 Dose:  1 patch


Miscellaneous (Lidoderm Patch Removal)  1 each MC DAILY@2200 [facility]


   Last Admin: 02/02/18 21:43 Dose:  Not Given


Ondansetron HCl (Zofran Injection)  4 mg IVPUSH Q6H PRN


   PRN Reason: NAUSEA AND/OR VOMITING


   Last Admin: 01/31/18 13:58 Dose:  4 mg


Polyethylene Glycol (Miralax (For Daily Use) -)  17 gm PO DAILY PRN


   PRN Reason: CONSTIPATION


   Last Admin: 01/31/18 10:24 Dose:  17 grams


Propranolol HCl (Inderal -)  20 mg PO BID [facility]


   Last Admin: 02/03/18 12:52 Dose:  20 mg


Zolpidem Tartrate (Ambien -)  5 mg PO HS PRN


   PRN Reason: INSOMNIA


   Last Admin: 02/02/18 01:17 Dose:  5 mg











- Objective


Vital Signs: 


 Vital Signs











Temperature  98.2 F   02/03/18 15:15


 


Pulse Rate  81   02/03/18 15:15


 


Respiratory Rate  20   02/03/18 15:15


 


Blood Pressure  142/82   02/03/18 15:15


 


O2 Sat by Pulse Oximetry (%)  95   02/02/18 21:00











Constitutional: Yes: Calm


Eyes: Yes: Conjunctiva Clear


HENT: Yes: Atraumatic


Neck: Yes: Supple


Cardiovascular: Yes: S1, S2


Respiratory: Yes: CTA Bilaterally


Gastrointestinal: Yes: Soft


Genitourinary: Yes: WNL


Musculoskeletal: Yes: Back Pain


Neurological: Yes: Oriented


Psychiatric: Yes: Oriented


Labs: 


 CBC, BMP





 01/28/18 23:35 





 02/02/18 06:00 





 INR, PTT











INR  1.19  (0.82-1.09)  H  01/30/18  09:55    














Problem List





- Problems


(1) Hyponatremia


Code(s): E87.1 - HYPO-OSMOLALITY AND HYPONATREMIA   





(2) Atelectasis of right lung


Code(s): J98.11 - ATELECTASIS   





(3) Back pain


Code(s): M54.9 - DORSALGIA, UNSPECIFIED   


Qualifiers: 


   Back pain location: low back pain   Chronicity: chronic   Back pain 

laterality: bilateral   Sciatica presence: without sciatica   Qualified Code(s)

: M54.5 - Low back pain; G89.29 - Other chronic pain; G89.29 - Other chronic 

pain   





(4) HTN (hypertension)


Code(s): I10 - ESSENTIAL (PRIMARY) HYPERTENSION   





Assessment/Plan


 Current Medications











Generic Name Dose Route Start Last Admin





  Trade Name Freq  PRN Reason Stop Dose Admin


 


Acetaminophen  650 mg  01/24/18 15:07  02/03/18 06:59





  Tylenol -  PO   650 mg





  Q4H PRN   Administration





  PAIN LEVEL 1 - 3   


 


Amino Acids  30 ml  02/03/18 17:30  





  Prosource No Carb Liquid Pkt  PO   





  BID@0800,1730 [facility]   


 


Calcitonin  200 units  01/20/18 10:00  02/03/18 11:07





  Miacalcin Spray -  NS   200 units





  DAILY [facility]   Administration


 


Calcium Carbonate/Cholecalciferol  1 tab  01/20/18 10:00  02/03/18 11:04





  Os-Vito 500+D -  PO   1 tab





  DAILY [facility]   Administration


 


Gabapentin  300 mg  01/25/18 10:00  02/03/18 13:13





  Neurontin -  PO   300 mg





  QID [facility]   Administration


 


Guaifenesin/Codeine Phosphate  5 ml  01/29/18 14:10  02/02/18 10:31





  Robitussin Ac -  PO   5 ml





  TID PRN   Administration





  COUGH   


 


Sodium Chloride  1,000 mls @ 40 mls/hr  02/01/18 11:52  02/03/18 13:01





  Normal Saline -  IV   Not Given





  ASDIR TEMI   


 


Lidocaine  1 patch  01/20/18 11:00  02/03/18 11:06





  Lidoderm Patch -  TP   1 patch





  DAILY [facility]   Administration


 


Miscellaneous  1 each  01/20/18 22:00  02/02/18 21:43





  Lidoderm Patch Removal  MC   Not Given





  DAILY@2200 [facility]I   


 


Ondansetron HCl  4 mg  01/20/18 09:09  01/31/18 13:58





  Zofran Injection  IVPUSH   4 mg





  Q6H PRN   Administration





  NAUSEA AND/OR VOMITING   


 


Polyethylene Glycol  17 gm  01/20/18 10:00  01/31/18 10:24





  Miralax (For Daily Use) -  PO   17 grams





  DAILY PRN   Administration





  CONSTIPATION   


 


Propranolol HCl  20 mg  01/29/18 22:00  02/03/18 12:52





  Inderal -  PO   20 mg





  BID [facility]   Administration


 


Zolpidem Tartrate  5 mg  01/31/18 22:00  02/02/18 01:17





  Ambien -  PO   5 mg





  HS PRN   Administration





  INSOMNIA   











Impression


1. hyponatremia


2. fever


3. back pain


4. htn


5. hyperlipidemia


6. influenza





Plan


- cont fluids


- check bmp


- stop fluids once sodium normalizes


- encourage PO intake


- will follow


Dr Mcfarlane

## 2018-02-04 LAB
ANION GAP SERPL CALC-SCNC: 11 MMOL/L (ref 8–16)
ANION GAP SERPL CALC-SCNC: 13 MMOL/L (ref 8–16)
BUN SERPL-MCNC: 11 MG/DL (ref 7–18)
BUN SERPL-MCNC: 13 MG/DL (ref 7–18)
CALCIUM SERPL-MCNC: 8.1 MG/DL (ref 8.5–10.1)
CALCIUM SERPL-MCNC: 8.1 MG/DL (ref 8.5–10.1)
CHLORIDE SERPL-SCNC: 95 MMOL/L (ref 98–107)
CHLORIDE SERPL-SCNC: 98 MMOL/L (ref 98–107)
CO2 SERPL-SCNC: 22 MMOL/L (ref 21–32)
CO2 SERPL-SCNC: 23 MMOL/L (ref 21–32)
CREAT SERPL-MCNC: 0.3 MG/DL (ref 0.55–1.02)
CREAT SERPL-MCNC: 0.4 MG/DL (ref 0.55–1.02)
GLUCOSE SERPL-MCNC: 105 MG/DL (ref 74–106)
GLUCOSE SERPL-MCNC: 78 MG/DL (ref 74–106)
POTASSIUM SERPLBLD-SCNC: 3.5 MMOL/L (ref 3.5–5.1)
POTASSIUM SERPLBLD-SCNC: 3.7 MMOL/L (ref 3.5–5.1)
SODIUM SERPL-SCNC: 130 MMOL/L (ref 136–145)
SODIUM SERPL-SCNC: 132 MMOL/L (ref 136–145)

## 2018-02-04 RX ADMIN — Medication SCH ML: at 18:28

## 2018-02-04 RX ADMIN — Medication SCH: at 09:08

## 2018-02-04 RX ADMIN — GABAPENTIN SCH MG: 300 CAPSULE ORAL at 21:18

## 2018-02-04 RX ADMIN — Medication SCH: at 21:51

## 2018-02-04 RX ADMIN — GABAPENTIN SCH MG: 300 CAPSULE ORAL at 14:30

## 2018-02-04 RX ADMIN — CALCITONIN SALMON SCH UNITS: 200 SPRAY, METERED NASAL at 10:39

## 2018-02-04 RX ADMIN — GABAPENTIN SCH MG: 300 CAPSULE ORAL at 18:29

## 2018-02-04 RX ADMIN — ACETAMINOPHEN PRN MG: 325 TABLET ORAL at 21:19

## 2018-02-04 RX ADMIN — Medication SCH TAB: at 10:38

## 2018-02-04 RX ADMIN — GABAPENTIN SCH MG: 300 CAPSULE ORAL at 10:38

## 2018-02-04 RX ADMIN — ONDANSETRON PRN MG: 2 INJECTION INTRAMUSCULAR; INTRAVENOUS at 11:28

## 2018-02-04 RX ADMIN — LIDOCAINE SCH PATCH: 50 PATCH TOPICAL at 10:38

## 2018-02-04 NOTE — PN
Progress Note, Physician


History of Present Illness: 





Pt seen and examined at bedside. She says she has not eaten anything yesterday 

or today. 





- Current Medication List


Current Medications: 


Active Medications





Acetaminophen (Tylenol -)  650 mg PO Q4H PRN


   PRN Reason: PAIN LEVEL 1 - 3


   Last Admin: 02/03/18 06:59 Dose:  650 mg


Amino Acids (Prosource No Carb Liquid Pkt)  30 ml PO BID@0800,1730 [facility]


   Last Admin: 02/04/18 09:08 Dose:  Not Given


Calcitonin (Miacalcin Spray -)  200 units NS DAILY [facility]


   Last Admin: 02/04/18 10:39 Dose:  200 units


Calcium Carbonate/Cholecalciferol (Os-Vito 500+D -)  1 tab PO DAILY [facility]


   Last Admin: 02/04/18 10:38 Dose:  1 tab


Gabapentin (Neurontin -)  300 mg PO QID [facility]


   Last Admin: 02/04/18 14:30 Dose:  300 mg


Guaifenesin/Codeine Phosphate (Robitussin Ac -)  5 ml PO BID PRN


   PRN Reason: COUGH


Sodium Chloride (Normal Saline -)  1,000 mls @ 40 mls/hr IV ASDIR [facility]


   Last Admin: 02/03/18 13:01 Dose:  Not Given


Lidocaine (Lidoderm Patch -)  1 patch TP DAILY [facility]


   Last Admin: 02/04/18 10:38 Dose:  1 patch


Miscellaneous (Lidoderm Patch Removal)  1 each MC DAILY@2200 [facility]


   Last Admin: 02/03/18 22:13 Dose:  Not Given


Ondansetron HCl (Zofran Odt -)  8 mg SL Q6H PRN


   PRN Reason: NAUSEA AND/OR VOMITING


Polyethylene Glycol (Miralax (For Daily Use) -)  17 gm PO DAILY PRN


   PRN Reason: CONSTIPATION


   Last Admin: 01/31/18 10:24 Dose:  17 grams


Propranolol HCl (Inderal -)  20 mg PO BID [facility]


   Last Admin: 02/04/18 10:38 Dose:  20 mg


Tramadol HCl (Ultram -)  50 mg PO Q6H PRN


   PRN Reason: PAIN LEVEL 4 - 6


   Last Admin: 02/03/18 18:13 Dose:  50 mg











- Objective


Vital Signs: 


 Vital Signs











Temperature  98 F   02/04/18 06:54


 


Pulse Rate  64   02/04/18 06:54


 


Respiratory Rate  20   02/04/18 06:54


 


Blood Pressure  122/72   02/04/18 06:54


 


O2 Sat by Pulse Oximetry (%)  95   02/02/18 21:00











Constitutional: Yes: Calm


Eyes: Yes: Conjunctiva Clear


HENT: Yes: Atraumatic


Neck: Yes: Supple


Cardiovascular: Yes: S1, S2


Respiratory: Yes: CTA Bilaterally


Gastrointestinal: Yes: WNL


Genitourinary: Yes: WNL


Musculoskeletal: Yes: Back Pain


Edema: No


Neurological: Yes: Oriented


Psychiatric: Yes: Oriented


Labs: 


 CBC, BMP





 01/28/18 23:35 





 02/04/18 14:40 





 INR, PTT











INR  1.19  (0.82-1.09)  H  01/30/18  09:55    














Problem List





- Problems


(1) Hyponatremia


Code(s): E87.1 - HYPO-OSMOLALITY AND HYPONATREMIA   





(2) Atelectasis of right lung


Code(s): J98.11 - ATELECTASIS   





(3) Back pain


Code(s): M54.9 - DORSALGIA, UNSPECIFIED   


Qualifiers: 


   Back pain location: low back pain   Chronicity: chronic   Back pain 

laterality: bilateral   Sciatica presence: without sciatica   Qualified Code(s)

: M54.5 - Low back pain; G89.29 - Other chronic pain; G89.29 - Other chronic 

pain   





(4) HTN (hypertension)


Code(s): I10 - ESSENTIAL (PRIMARY) HYPERTENSION   





Assessment/Plan


 Current Medications











Generic Name Dose Route Start Last Admin





  Trade Name Freq  PRN Reason Stop Dose Admin


 


Acetaminophen  650 mg  01/24/18 15:07  02/03/18 06:59





  Tylenol -  PO   650 mg





  Q4H PRN   Administration





  PAIN LEVEL 1 - 3   


 


Amino Acids  30 ml  02/03/18 17:30  02/04/18 09:08





  Prosource No Carb Liquid Pkt  PO   Not Given





  BID@0800,1730 [facility]   


 


Calcitonin  200 units  01/20/18 10:00  02/04/18 10:39





  Miacalcin Spray -  NS   200 units





  DAILY [facility]   Administration


 


Calcium Carbonate/Cholecalciferol  1 tab  01/20/18 10:00  02/04/18 10:38





  Os-Vito 500+D -  PO   1 tab





  DAILY [facility]   Administration


 


Gabapentin  300 mg  01/25/18 10:00  02/04/18 14:30





  Neurontin -  PO   300 mg





  QID [facility]   Administration


 


Guaifenesin/Codeine Phosphate  5 ml  02/04/18 12:25  





  Robitussin Ac -  PO   





  BID PRN   





  COUGH   


 


Sodium Chloride  1,000 mls @ 40 mls/hr  02/01/18 11:52  02/03/18 13:01





  Normal Saline -  IV   Not Given





  ASDIR [facility]   


 


Lidocaine  1 patch  01/20/18 11:00  02/04/18 10:38





  Lidoderm Patch -  TP   1 patch





  DAILY [facility]   Administration


 


Miscellaneous  1 each  01/20/18 22:00  02/03/18 22:13





  Lidoderm Patch Removal  MC   Not Given





  DAILY@2200 [facility]   


 


Ondansetron HCl  8 mg  02/04/18 12:20  





  Zofran Odt -  SL   





  Q6H PRN   





  NAUSEA AND/OR VOMITING   


 


Polyethylene Glycol  17 gm  01/20/18 10:00  01/31/18 10:24





  Miralax (For Daily Use) -  PO   17 grams





  DAILY PRN   Administration





  CONSTIPATION   


 


Propranolol HCl  20 mg  01/29/18 22:00  02/04/18 10:38





  Inderal -  PO   20 mg





  BID [facility]   Administration


 


Tramadol HCl  50 mg  02/03/18 17:20  02/03/18 18:13





  Ultram -  PO   50 mg





  Q6H PRN   Administration





  PAIN LEVEL 4 - 6   











Impression


1. hyponatremia


2. fever


3. back pain


4. htn


5. hyperlipidemia


6. influenza





Plan


- cont saline until appetite improved


- monitor sodium levels


- pain control


- encourage PO intake


- will follow


Dr Mcfarlane

## 2018-02-04 NOTE — PN
Progress Note, Physician


Chief Complaint: 





C/O NAUSEA


POOR APPETITE





- Current Medication List


Current Medications: 


Active Medications





Acetaminophen (Tylenol -)  650 mg PO Q4H PRN


   PRN Reason: PAIN LEVEL 1 - 3


   Last Admin: 02/03/18 06:59 Dose:  650 mg


Amino Acids (Prosource No Carb Liquid Pkt)  30 ml PO BID@0800,1730 [facility]


   Last Admin: 02/04/18 09:08 Dose:  Not Given


Calcitonin (Miacalcin Spray -)  200 units NS DAILY [facility]


   Last Admin: 02/04/18 10:39 Dose:  200 units


Calcium Carbonate/Cholecalciferol (Os-Vito 500+D -)  1 tab PO DAILY [facility]


   Last Admin: 02/04/18 10:38 Dose:  1 tab


Gabapentin (Neurontin -)  300 mg PO QID [facility]


   Last Admin: 02/04/18 10:38 Dose:  300 mg


Sodium Chloride (Normal Saline -)  1,000 mls @ 40 mls/hr IV ASDIR [facility]


   Last Admin: 02/03/18 13:01 Dose:  Not Given


Famotidine (Pepcid 20 Mg/12 Ml Push)  20 mg in 12 mls @ 144 mls/hr IVPB ONCE ONE


   Stop: 02/04/18 12:25


Lidocaine (Lidoderm Patch -)  1 patch TP DAILY [facility]


   Last Admin: 02/04/18 10:38 Dose:  1 patch


Miscellaneous (Lidoderm Patch Removal)  1 each MC DAILY@2200 [facility]


   Last Admin: 02/03/18 22:13 Dose:  Not Given


Ondansetron HCl (Zofran Injection)  4 mg IVPUSH Q6H PRN


   PRN Reason: NAUSEA AND/OR VOMITING


   Last Admin: 02/04/18 11:28 Dose:  4 mg


Ondansetron HCl (Zofran Odt -)  8 mg SL Q6H PRN


   PRN Reason: NAUSEA AND/OR VOMITING


Polyethylene Glycol (Miralax (For Daily Use) -)  17 gm PO DAILY PRN


   PRN Reason: CONSTIPATION


   Last Admin: 01/31/18 10:24 Dose:  17 grams


Propranolol HCl (Inderal -)  20 mg PO BID [facility]


   Last Admin: 02/04/18 10:38 Dose:  20 mg


Tramadol HCl (Ultram -)  50 mg PO Q6H PRN


   PRN Reason: PAIN LEVEL 4 - 6


   Last Admin: 02/03/18 18:13 Dose:  50 mg











- Objective


Vital Signs: 


 Vital Signs











Temperature  98 F   02/04/18 06:54


 


Pulse Rate  64   02/04/18 06:54


 


Respiratory Rate  20   02/04/18 06:54


 


Blood Pressure  122/72   02/04/18 06:54


 


O2 Sat by Pulse Oximetry (%)  95   02/02/18 21:00











Constitutional: Yes: Mild Distress


Eyes: Yes: WNL


HENT: Yes: WNL


Neck: Yes: WNL


Cardiovascular: Yes: WNL


Respiratory: Yes: Cough, Diminished


Gastrointestinal: Yes: WNL


Genitourinary: Yes: WNL


Musculoskeletal: Yes: Back Pain, Muscle Weakness


Extremities: Yes: WNL


Edema: No


Peripheral Pulses WNL: Yes


Integumentary: Yes: WNL


Wound/Incision: Yes: Clean/Dry


...Motor Strength: WNL


Psychiatric: Yes: WNL


Labs: 


 CBC, BMP





 01/28/18 23:35 





 02/04/18 07:15 





 INR, PTT











INR  1.19  (0.82-1.09)  H  01/30/18  09:55    














Problem List





- Problems


(1) Osteoporosis


Code(s): M81.0 - AGE-RELATED OSTEOPOROSIS W/O CURRENT PATHOLOGICAL FRACTURE   


Qualifiers: 


   Osteoporosis type: age-related 





(2) Atelectasis of right lung


Code(s): J98.11 - ATELECTASIS   





(3) Back pain


Code(s): M54.9 - DORSALGIA, UNSPECIFIED   


Qualifiers: 


   Back pain location: low back pain   Chronicity: chronic   Back pain 

laterality: bilateral   Sciatica presence: without sciatica   Qualified Code(s)

: M54.5 - Low back pain; G89.29 - Other chronic pain; G89.29 - Other chronic 

pain   





(4) DVT prophylaxis


Code(s): [id number] -    





(5) Influenza A


Code(s): J10.1 - FLU DUE TO OTH IDENT INFLUENZA VIRUS W OTH RESP MANIFEST   





(6) Intractable low back pain


Code(s): M54.5 - LOW BACK PAIN   





Assessment/Plan





RECEIVED RECLAST YESTERDAY ANNUAL DOSE FOR OSTEOPOROSIS


AND BONE FRACTURE PREVENTION


VIT D CONTINUED


ZOFRAN PO AND IV


PEPCID IV X 1


IVF


HYPONATREMIA WORSE, RENAL EVAL


ENSURE AND IVF

## 2018-02-05 VITALS — TEMPERATURE: 98.3 F | DIASTOLIC BLOOD PRESSURE: 50 MMHG | HEART RATE: 73 BPM | SYSTOLIC BLOOD PRESSURE: 109 MMHG

## 2018-02-05 LAB
ANION GAP SERPL CALC-SCNC: 12 MMOL/L (ref 8–16)
BUN SERPL-MCNC: 16 MG/DL (ref 7–18)
CALCIUM SERPL-MCNC: 7.4 MG/DL (ref 8.5–10.1)
CHLORIDE SERPL-SCNC: 100 MMOL/L (ref 98–107)
CO2 SERPL-SCNC: 22 MMOL/L (ref 21–32)
CREAT SERPL-MCNC: 0.4 MG/DL (ref 0.55–1.02)
GLUCOSE SERPL-MCNC: 76 MG/DL (ref 74–106)
POTASSIUM SERPLBLD-SCNC: 3.5 MMOL/L (ref 3.5–5.1)
SODIUM SERPL-SCNC: 134 MMOL/L (ref 136–145)

## 2018-02-05 RX ADMIN — CALCITONIN SALMON SCH UNITS: 200 SPRAY, METERED NASAL at 10:39

## 2018-02-05 RX ADMIN — ACETAMINOPHEN PRN MG: 325 TABLET ORAL at 13:48

## 2018-02-05 RX ADMIN — Medication SCH TAB: at 10:40

## 2018-02-05 RX ADMIN — GABAPENTIN SCH MG: 300 CAPSULE ORAL at 15:27

## 2018-02-05 RX ADMIN — GABAPENTIN SCH MG: 300 CAPSULE ORAL at 10:40

## 2018-02-05 RX ADMIN — Medication SCH ML: at 09:04

## 2018-02-05 RX ADMIN — LIDOCAINE SCH PATCH: 50 PATCH TOPICAL at 10:40

## 2018-02-05 RX ADMIN — Medication SCH ML: at 17:35

## 2018-02-05 RX ADMIN — GABAPENTIN SCH MG: 300 CAPSULE ORAL at 17:35

## 2018-02-05 NOTE — PN
Progress Note, Physician


Chief Complaint: 





Intractable back pain


History of Present Illness: 





Pain management


seen by Neurology


started on Gabapentin


Sodium at 134 today


discharge to rehab today





- Current Medication List


Current Medications: 


Active Medications





Acetaminophen (Tylenol -)  650 mg PO Q4H PRN


   PRN Reason: PAIN LEVEL 1 - 3


   Last Admin: 02/04/18 21:19 Dose:  650 mg


Amino Acids (Prosource No Carb Liquid Pkt)  30 ml PO BID@0800,1730 [facility]


   Last Admin: 02/05/18 09:04 Dose:  30 ml


Calcitonin (Miacalcin Spray -)  200 units NS DAILY [facility]


   Last Admin: 02/04/18 10:39 Dose:  200 units


Calcium Carbonate/Cholecalciferol (Os-Vito 500+D -)  1 tab PO DAILY [facility]


   Last Admin: 02/04/18 10:38 Dose:  1 tab


Gabapentin (Neurontin -)  300 mg PO QID [facility]


   Last Admin: 02/04/18 21:18 Dose:  300 mg


Guaifenesin/Codeine Phosphate (Robitussin Ac -)  5 ml PO BID PRN


   PRN Reason: COUGH


Sodium Chloride (Normal Saline -)  1,000 mls @ 55 mls/hr IV ASDIR [facility]


   Last Admin: 02/04/18 18:29 Dose:  55 mls/hr


Lidocaine (Lidoderm Patch -)  1 patch TP DAILY [facility]


   Last Admin: 02/04/18 10:38 Dose:  1 patch


Miscellaneous (Lidoderm Patch Removal)  1 each MC DAILY@2200 [facility]


   Last Admin: 02/04/18 21:51 Dose:  Not Given


Ondansetron HCl (Zofran Odt -)  8 mg SL Q6H PRN


   PRN Reason: NAUSEA AND/OR VOMITING


Polyethylene Glycol (Miralax (For Daily Use) -)  17 gm PO DAILY PRN


   PRN Reason: CONSTIPATION


   Last Admin: 01/31/18 10:24 Dose:  17 grams


Propranolol HCl (Inderal -)  20 mg PO BID [facility]


   Last Admin: 02/04/18 21:18 Dose:  20 mg


Tramadol HCl (Ultram -)  50 mg PO Q6H PRN


   PRN Reason: PAIN LEVEL 4 - 6


   Last Admin: 02/03/18 18:13 Dose:  50 mg











- Objective


Vital Signs: 


 Vital Signs











Temperature  98.1 F   02/05/18 06:26


 


Pulse Rate  80   02/05/18 06:26


 


Respiratory Rate  18   02/05/18 06:26


 


Blood Pressure  135/69   02/05/18 06:26


 


O2 Sat by Pulse Oximetry (%)  95   02/02/18 21:00











Constitutional: Yes: Well Nourished, No Distress, Calm


Cardiovascular: Yes: Regular Rate and Rhythm


Respiratory: Yes: Regular


Musculoskeletal: Yes: WNL


Extremities: Yes: WNL


Edema: No


Peripheral Pulses WNL: Yes


Neurological: Yes: Alert, Oriented


Psychiatric: Yes: Alert, Oriented


Labs: 


 CBC, BMP





 01/28/18 23:35 





 02/05/18 06:00 





 INR, PTT











INR  1.19  (0.82-1.09)  H  01/30/18  09:55    














Problem List





- Problems


(1) Intractable low back pain


Assessment/Plan: 


-Pain management


-Neurology consult appreciated


-wants to go home


-symptoms improved with gabapentin, increased dose


-PT


-TLSO brace ordered,


Code(s): M54.5 - LOW BACK PAIN   





(2) Compression fracture of L4 lumbar vertebra


Assessment/Plan: 


MRI lumbar spine reviewed


-see by Neurology


-Physical therapy


-gabapentin


Code(s): S32.040A - WEDGE COMPRESSION FRACTURE OF FOURTH LUMBAR VERTEBRA, INIT 

  





(3) Sinus arrhythmia


Assessment/Plan: 


-seen by cardiology


-no other cardiac intervention recommended


Code(s): I49.9 - CARDIAC ARRHYTHMIA, UNSPECIFIED   





(4) Sinus arrhythmia seen on electrocardiogram


Code(s): I49.8 - OTHER SPECIFIED CARDIAC ARRHYTHMIAS   





(5) Hyponatremia


Assessment/Plan: 


low serum osm


-seen by nephrology


-improved today


Code(s): E87.1 - HYPO-OSMOLALITY AND HYPONATREMIA   





Assessment/Plan





see problem list

## 2018-02-05 NOTE — DS
Physical Examination


Vital Signs: 


 Vital Signs











Temperature  98.1 F   02/05/18 06:26


 


Pulse Rate  80   02/05/18 06:26


 


Respiratory Rate  18   02/05/18 06:26


 


Blood Pressure  135/69   02/05/18 06:26


 


O2 Sat by Pulse Oximetry (%)  95   02/02/18 21:00











Constitutional: Yes: Well Nourished, No Distress, Calm


Cardiovascular: Yes: Regular Rate and Rhythm, Varicosities


Gastrointestinal: Yes: WNL


Musculoskeletal: Yes: Back Pain


Extremities: Yes: WNL


Edema: No


Peripheral Pulses WNL: Yes


Neurological: Yes: Alert, Oriented


Psychiatric: Yes: Alert, Oriented


Labs: 


 CBC, BMP





 01/28/18 23:35 





 02/05/18 06:00 











Discharge Summary


Reason For Visit: BACK PAIN


Current Active Problems





Atelectasis of right lung (Acute)


Back pain (Acute)


DVT prophylaxis (Acute)


Fever (Acute)


Hyponatremia (Acute)


Influenza A (Acute)


Intractable low back pain (Acute)


Osteoporosis (Acute)


Sinus arrhythmia (Acute)


Sinus arrhythmia seen on electrocardiogram (Acute)








Hospital Course: 





This is a 87 y/o woman with a past medical history of Chronic Back Pain, 

Chronic Compression Fracture, Thyrid, HLD. Who presents to the ED with back 

pain. Patient reports severe lower back pain. Patient reports increase pain 

with movement. Patient states" I can't walk or sleep because of the pain, which 

is sharp across my lower back". Patient was seen in the ED 1/18 for same- d/cd 

with Naproxen, now without relief. Patient denies recent fall or trauma. 

Patient denies numbness to her lower extremities. Patient denies bowel or 

bladder incontinence. Patient denies fever, chills, cough, SOB, CP, palpitations

, N/V/D, constipation, dysuria


During her stay she contracted Influenza A. She was treated with Tamiflu and 

was isolated for 7 days. She was also hyponatremic, for which she was evaluated 

by Nephrology and hyponatremia is gradually resolving.


Condition: Stable





- Instructions


Diet, Activity, Other Instructions: 


repeat CBC/CMP in 1 week


Disposition: SKILLED NURSING FACILITY





- Home Medications


Comprehensive Discharge Medication List: 


Ambulatory Orders





Polyethylene Glycol 3350 [Miralax 119 gm Btl -] 17 gm PO DAILY PRN 30 Days  

bottle 01/23/15 


Calcium 500Mg/Vit-D 200 Units [Os-Vito 500+D -] 1 combo PO DAILY #0 tablet 04/14/

15 


Zolpidem Tartrate [Ambien] 10 mg PO HS PRN #0 tablet 04/14/15 


Propranolol HCl [Inderal -] 20 mg PO TID 04/22/17 


Alendronate Na [Fosamax (Weekly)] 70 mg PO Q7D 01/16/18 


Calcitonin-[place] [Miacalcin [place] -] 200 units NS DAILY #1 spray.pump 01/24/18 


Lidocaine 5% Patch [Lidoderm -] 1 patch TP DAILY #30 patch 01/24/18 


Gabapentin [Neurontin -] 300 mg PO QID #20 capsule MDD 3 01/26/18 


Lidocaine Patch Removal [Lidoderm Patch Removal] 1 each MC DAILY@2200  each 01/ 26/18 


Tramadol HCl [Ultram -] 50 mg PO Q6H PRN 7 Days #10 tablet MDD 3 01/26/18

## 2018-02-05 NOTE — PN
Progress Note, Physician


History of Present Illness: 





Pt seen and examined at bedside. She says that appetite is better today. She 

denies shortness of breath. 





- Current Medication List


Current Medications: 


Active Medications





Acetaminophen (Tylenol -)  650 mg PO Q4H PRN


   PRN Reason: PAIN LEVEL 1 - 3


   Last Admin: 02/05/18 13:48 Dose:  650 mg


Amino Acids (Prosource No Carb Liquid Pkt)  30 ml PO BID@0800,1730 [facility]


   Last Admin: 02/05/18 09:04 Dose:  30 ml


Calcitonin (Miacalcin Spray -)  200 units NS DAILY [facility]


   Last Admin: 02/05/18 10:39 Dose:  200 units


Calcium Carbonate/Cholecalciferol (Os-Vito 500+D -)  1 tab PO DAILY [facility]


   Last Admin: 02/05/18 10:40 Dose:  1 tab


Gabapentin (Neurontin -)  300 mg PO QID [facility]


   Last Admin: 02/05/18 15:27 Dose:  300 mg


Guaifenesin/Codeine Phosphate (Robitussin Ac -)  5 ml PO BID PRN


   PRN Reason: COUGH


   Last Admin: 02/05/18 10:50 Dose:  5 ml


Sodium Chloride (Normal Saline -)  1,000 mls @ 55 mls/hr IV ASDIR [facility]


   Last Admin: 02/04/18 18:29 Dose:  55 mls/hr


Lidocaine (Lidoderm Patch -)  1 patch TP DAILY [facility]


   Last Admin: 02/05/18 10:40 Dose:  1 patch


Miscellaneous (Lidoderm Patch Removal)  1 each MC DAILY@2200 [facility]


   Last Admin: 02/04/18 21:51 Dose:  Not Given


Ondansetron HCl (Zofran Odt -)  8 mg SL Q6H PRN


   PRN Reason: NAUSEA AND/OR VOMITING


Polyethylene Glycol (Miralax (For Daily Use) -)  17 gm PO DAILY PRN


   PRN Reason: CONSTIPATION


   Last Admin: 01/31/18 10:24 Dose:  17 grams


Propranolol HCl (Inderal -)  20 mg PO BID [facility]


   Last Admin: 02/05/18 10:40 Dose:  Not Given


Tramadol HCl (Ultram -)  50 mg PO Q6H PRN


   PRN Reason: PAIN LEVEL 4 - 6


   Last Admin: 02/05/18 10:50 Dose:  50 mg











- Objective


Vital Signs: 


 Vital Signs











Temperature  98.3 F   02/05/18 15:39


 


Pulse Rate  73   02/05/18 15:39


 


Respiratory Rate  20   02/05/18 15:39


 


Blood Pressure  109/50   02/05/18 15:39


 


O2 Sat by Pulse Oximetry (%)  95   02/02/18 21:00











Constitutional: Yes: Calm


Eyes: Yes: Conjunctiva Clear


HENT: Yes: Atraumatic


Cardiovascular: Yes: S1, S2


Respiratory: Yes: CTA Bilaterally


Gastrointestinal: Yes: Normal Bowel Sounds, Soft


Genitourinary: Yes: WNL


Musculoskeletal: Yes: WNL


Edema: No


Neurological: Yes: Oriented


Psychiatric: Yes: Oriented


Labs: 


 CBC, BMP





 01/28/18 23:35 





 02/05/18 06:00 





 INR, PTT











INR  1.19  (0.82-1.09)  H  01/30/18  09:55    














Problem List





- Problems


(1) Hyponatremia


Code(s): E87.1 - HYPO-OSMOLALITY AND HYPONATREMIA   





(2) Atelectasis of right lung


Code(s): J98.11 - ATELECTASIS   





(3) Back pain


Code(s): M54.9 - DORSALGIA, UNSPECIFIED   


Qualifiers: 


   Back pain location: low back pain   Chronicity: chronic   Back pain 

laterality: bilateral   Sciatica presence: without sciatica   Qualified Code(s)

: M54.5 - Low back pain; G89.29 - Other chronic pain; G89.29 - Other chronic 

pain   





(4) HTN (hypertension)


Code(s): I10 - ESSENTIAL (PRIMARY) HYPERTENSION   





Assessment/Plan


 Current Medications











Generic Name Dose Route Start Last Admin





  Trade Name [place]  PRN Reason Stop Dose Admin


 


Acetaminophen  650 mg  01/24/18 15:07  02/05/18 13:48





  Tylenol -  PO   650 mg





  Q4H PRN   Administration





  PAIN LEVEL 1 - 3   


 


Amino Acids  30 ml  02/03/18 17:30  02/05/18 09:04





  Prosource No Carb Liquid Pkt  PO   30 ml





  BID@0800,1730 [facility]   Administration


 


Calcitonin  200 units  01/20/18 10:00  02/05/18 10:39





  Miacalcin Spray -  NS   200 units





  DAILY [facility]   Administration


 


Calcium Carbonate/Cholecalciferol  1 tab  01/20/18 10:00  02/05/18 10:40





  Os-Vito 500+D -  PO   1 tab





  DAILY [facility]   Administration


 


Gabapentin  300 mg  01/25/18 10:00  02/05/18 15:27





  Neurontin -  PO   300 mg





  QID [facility]   Administration


 


Guaifenesin/Codeine Phosphate  5 ml  02/04/18 12:25  02/05/18 10:50





  Robitussin Ac -  PO   5 ml





  BID PRN   Administration





  COUGH   


 


Sodium Chloride  1,000 mls @ 55 mls/hr  02/04/18 17:44  02/04/18 18:29





  Normal Saline -  IV   55 mls/hr





  ASDIR [facility]   Administration


 


Lidocaine  1 patch  01/20/18 11:00  02/05/18 10:40





  Lidoderm Patch -  TP   1 patch





  DAILY [facility]   Administration


 


Miscellaneous  1 each  01/20/18 22:00  02/04/18 21:51





  Lidoderm Patch Removal  MC   Not Given





  DAILY@2200 [facility]   


 


Ondansetron HCl  8 mg  02/04/18 12:20  





  Zofran Odt -  SL   





  Q6H PRN   





  NAUSEA AND/OR VOMITING   


 


Polyethylene Glycol  17 gm  01/20/18 10:00  01/31/18 10:24





  Miralax (For Daily Use) -  PO   17 grams





  DAILY PRN   Administration





  CONSTIPATION   


 


Propranolol HCl  20 mg  01/29/18 22:00  02/05/18 10:40





  Inderal -  PO   Not Given





  BID [facility]   


 


Tramadol HCl  50 mg  02/03/18 17:20  02/05/18 10:50





  Ultram -  PO   50 mg





  Q6H PRN   Administration





  PAIN LEVEL 4 - 6   











Impression


1. hyponatremia


2. fever


3. back pain


4. htn


5. hyperlipidemia


6. influenza





Plan


- sodium is improving


- monitor labs in rehab


- encourage PO intake


- discussed with primary team


- cont saline for now while she is here


- pain control


- will follow


Dr Mcfarlane

## 2018-02-13 NOTE — PN
Progress Note (short form)





- Note


Progress Note: 





Addendum Diagnosis:


Patient has severe malnutrition as diagnosis.





Problem List





- Problems


(1) Osteoporosis


Code(s): M81.0 - AGE-RELATED OSTEOPOROSIS W/O CURRENT PATHOLOGICAL FRACTURE   


Qualifiers: 


   Osteoporosis type: age-related 





(2) Atelectasis of right lung


Code(s): J98.11 - ATELECTASIS   





(3) Back pain


Code(s): M54.9 - DORSALGIA, UNSPECIFIED   


Qualifiers: 


   Back pain location: low back pain   Chronicity: chronic   Back pain 

laterality: bilateral   Sciatica presence: without sciatica   Qualified Code(s)

: M54.5 - Low back pain; G89.29 - Other chronic pain; G89.29 - Other chronic 

pain   





(4) DVT prophylaxis


Code(s): [id number] -    





(5) Influenza A


Code(s): J10.1 - FLU DUE TO OTH IDENT INFLUENZA VIRUS W OTH RESP MANIFEST   





(6) Intractable low back pain


Code(s): M54.5 - LOW BACK PAIN

## 2018-02-20 ENCOUNTER — HOSPITAL ENCOUNTER (OUTPATIENT)
Dept: HOSPITAL 74 - JRADIR | Age: 83
Discharge: NURSING, REHAB OR LONG-TERM CARE | End: 2018-02-20
Attending: PHYSICIAN ASSISTANT
Payer: COMMERCIAL

## 2018-02-20 VITALS — BODY MASS INDEX: 18.9 KG/M2

## 2018-02-20 VITALS — TEMPERATURE: 98 F

## 2018-02-20 VITALS — DIASTOLIC BLOOD PRESSURE: 78 MMHG | HEART RATE: 76 BPM | SYSTOLIC BLOOD PRESSURE: 120 MMHG

## 2018-02-20 DIAGNOSIS — M48.54XA: Primary | ICD-10-CM

## 2018-02-20 DIAGNOSIS — I10: ICD-10-CM

## 2018-02-20 DIAGNOSIS — M54.9: ICD-10-CM

## 2018-02-20 LAB
BASOPHILS # BLD: 1 % (ref 0–2)
DEPRECATED RDW RBC AUTO: 14.1 % (ref 11.6–15.6)
EOSINOPHIL # BLD: 5.8 % (ref 0–4.5)
HCT VFR BLD CALC: 42 % (ref 32.4–45.2)
HGB BLD-MCNC: 13.9 GM/DL (ref 10.7–15.3)
INR BLD: 1.08 (ref 0.82–1.09)
LYMPHOCYTES # BLD: 29.7 % (ref 8–40)
MCH RBC QN AUTO: 29.3 PG (ref 25.7–33.7)
MCHC RBC AUTO-ENTMCNC: 33.2 G/DL (ref 32–36)
MCV RBC: 88.2 FL (ref 80–96)
MONOCYTES # BLD AUTO: 9.2 % (ref 3.8–10.2)
NEUTROPHILS # BLD: 54.3 % (ref 42.8–82.8)
PLATELET # BLD AUTO: 292 K/MM3 (ref 134–434)
PMV BLD: 7.6 FL (ref 7.5–11.1)
PT PNL PPP: 12.2 SEC (ref 9.98–11.88)
RBC # BLD AUTO: 4.76 M/MM3 (ref 3.6–5.2)
WBC # BLD AUTO: 5.5 K/MM3 (ref 4–10)

## 2018-02-20 PROCEDURE — C1713 ANCHOR/SCREW BN/BN,TIS/BN: HCPCS

## 2018-02-20 PROCEDURE — 0PU43JZ SUPPLEMENT THORACIC VERTEBRA WITH SYNTHETIC SUBSTITUTE, PERCUTANEOUS APPROACH: ICD-10-PCS | Performed by: RADIOLOGY

## 2018-02-27 NOTE — PATH
Surgical Pathology Report



Patient Name:  KIMMY ALLISON

Accession #:  [id number]

[facility]. Rec. #:  [id number]                                                        

   /Age/Gender:  1929 (Age: 88) / F

Account:  [id number]                                                          

             Location: 

Taken:  2018

Received:  2018

Reported:  2018

Physicians:  Félix Crawley M.D.

  



Specimen(s) Received

 BX THORACIC SPINE BIOPSY 





Clinical History

T11 compression fracture







Final Diagnosis

THORACIC SPINE, BIOPSY:RARE MINUTE FRAGMENT OF BONE IN HEMORRHAGIC BACKGROUND.





***Electronically Signed***

Janice Ricks M.D.





Gross Description

Received in formalin labeled "thoracic spine biopsy," is a 0.3 x 0.3 x 0.1 cm

aggregate of red-brown blood clot, possibly containing bone fragments. The

formalin is filtered and the specimen is entirely submitted in one cassette,

following decalcification.

/2018



[place]/2018